# Patient Record
Sex: MALE | Race: OTHER | Employment: OTHER | URBAN - METROPOLITAN AREA
[De-identification: names, ages, dates, MRNs, and addresses within clinical notes are randomized per-mention and may not be internally consistent; named-entity substitution may affect disease eponyms.]

---

## 2018-06-03 ENCOUNTER — HOSPITAL ENCOUNTER (EMERGENCY)
Age: 68
Discharge: HOME | End: 2018-06-03
Attending: EMERGENCY MEDICINE
Payer: MEDICARE

## 2018-06-03 VITALS
TEMPERATURE: 98 F | OXYGEN SATURATION: 99 % | RESPIRATION RATE: 20 BRPM | DIASTOLIC BLOOD PRESSURE: 70 MMHG | SYSTOLIC BLOOD PRESSURE: 124 MMHG | WEIGHT: 220 LBS | HEART RATE: 80 BPM | BODY MASS INDEX: 32.58 KG/M2 | HEIGHT: 69 IN

## 2018-06-03 DIAGNOSIS — R33.9 URINARY RETENTION: Primary | ICD-10-CM

## 2018-06-03 DIAGNOSIS — E86.0 DEHYDRATION: ICD-10-CM

## 2018-06-03 PROCEDURE — 51702 INSERT TEMP BLADDER CATH: CPT | Performed by: EMERGENCY MEDICINE

## 2018-06-03 PROCEDURE — 81003 URINALYSIS AUTO W/O SCOPE: CPT | Performed by: PHYSICIAN ASSISTANT

## 2018-06-03 PROCEDURE — 85025 COMPLETE CBC W/AUTO DIFF WBC: CPT | Performed by: PHYSICIAN ASSISTANT

## 2018-06-03 PROCEDURE — 99283 EMERGENCY DEPT VISIT LOW MDM: CPT | Performed by: EMERGENCY MEDICINE

## 2018-06-03 PROCEDURE — 99284 EMERGENCY DEPT VISIT MOD MDM: CPT | Performed by: EMERGENCY MEDICINE

## 2018-06-03 PROCEDURE — 80048 BASIC METABOLIC PNL TOTAL CA: CPT | Performed by: PHYSICIAN ASSISTANT

## 2018-06-03 PROCEDURE — 96360 HYDRATION IV INFUSION INIT: CPT | Performed by: EMERGENCY MEDICINE

## 2018-06-03 RX ORDER — TAMSULOSIN HYDROCHLORIDE 0.4 MG/1
0.4 CAPSULE ORAL DAILY
COMMUNITY

## 2018-06-03 RX ORDER — HYDROCHLOROTHIAZIDE 12.5 MG/1
12.5 CAPSULE, GELATIN COATED ORAL DAILY
COMMUNITY

## 2018-06-03 RX ORDER — OXYBUTYNIN CHLORIDE 10 MG/1
10 TABLET, EXTENDED RELEASE ORAL DAILY
COMMUNITY

## 2018-06-03 RX ORDER — METOPROLOL SUCCINATE 25 MG/1
25 TABLET, EXTENDED RELEASE ORAL DAILY
COMMUNITY

## 2018-06-03 RX ORDER — ENALAPRIL MALEATE 20 MG/1
20 TABLET ORAL DAILY
COMMUNITY

## 2018-06-03 RX ORDER — CEPHALEXIN 500 MG/1
500 CAPSULE ORAL ONCE
Status: COMPLETED | OUTPATIENT
Start: 2018-06-03 | End: 2018-06-03

## 2018-06-03 RX ORDER — CEPHALEXIN 500 MG/1
500 CAPSULE ORAL 2 TIMES DAILY
Qty: 20 CAPSULE | Refills: 0 | Status: SHIPPED | OUTPATIENT
Start: 2018-06-03 | End: 2018-06-13

## 2018-06-03 NOTE — ED PROVIDER NOTES
Patient Seen in: [facility] In [place]    History   Patient presents with:  Urinary Symptoms (urologic)    Stated Complaint: urine retention    79-year-old male with a history of diabetes, hypertension, BPH presents to the ER with com Neurological: He is alert. Psychiatric: He has a normal mood and affect.          ED Course     Labs Reviewed   URINALYSIS WITH CULTURE REFLEX - Abnormal; Notable for the following:        Result Value    Blood Urine Trace-lysed (*)     All other compon Prescribed:  Current Discharge Medication List    START taking these medications    cephALEXin (KEFLEX) 500 MG Oral Cap  Take 1 capsule (500 mg total) by mouth 2 (two) times daily.   Qty: 20 capsule Refills: 0

## 2018-06-03 NOTE — ED NOTES
Leg bag applied for patient to go home with catheter  Yuan care discussed with spouse and patient. Denied further questions.

## 2018-06-03 NOTE — ED PROVIDER NOTES
I reviewed that chart and discussed the case. I have examined the patient and noted HEENT exam is normal lungs were normal cardia vascular exam shows regular rhythm abdomen soft nontender.       I agree with the following clinical impression(s):  Urinary r

## 2019-06-21 ENCOUNTER — HOSPITAL ENCOUNTER (EMERGENCY)
Age: 69
Discharge: HOME | End: 2019-06-21
Attending: EMERGENCY MEDICINE
Payer: MEDICARE

## 2019-06-21 VITALS
HEIGHT: 69 IN | BODY MASS INDEX: 32.58 KG/M2 | WEIGHT: 220 LBS | TEMPERATURE: 100 F | SYSTOLIC BLOOD PRESSURE: 143 MMHG | HEART RATE: 101 BPM | DIASTOLIC BLOOD PRESSURE: 79 MMHG | RESPIRATION RATE: 18 BRPM | OXYGEN SATURATION: 100 %

## 2019-06-21 DIAGNOSIS — R73.9 HYPERGLYCEMIA: ICD-10-CM

## 2019-06-21 DIAGNOSIS — E86.0 DEHYDRATION: ICD-10-CM

## 2019-06-21 DIAGNOSIS — N30.01 ACUTE CYSTITIS WITH HEMATURIA: Primary | ICD-10-CM

## 2019-06-21 PROCEDURE — 82962 GLUCOSE BLOOD TEST: CPT

## 2019-06-21 PROCEDURE — 96372 THER/PROPH/DIAG INJ SC/IM: CPT

## 2019-06-21 PROCEDURE — 87086 URINE CULTURE/COLONY COUNT: CPT | Performed by: EMERGENCY MEDICINE

## 2019-06-21 PROCEDURE — 80053 COMPREHEN METABOLIC PANEL: CPT | Performed by: EMERGENCY MEDICINE

## 2019-06-21 PROCEDURE — 85025 COMPLETE CBC W/AUTO DIFF WBC: CPT | Performed by: EMERGENCY MEDICINE

## 2019-06-21 PROCEDURE — 83605 ASSAY OF LACTIC ACID: CPT | Performed by: EMERGENCY MEDICINE

## 2019-06-21 PROCEDURE — 36415 COLL VENOUS BLD VENIPUNCTURE: CPT

## 2019-06-21 PROCEDURE — 99284 EMERGENCY DEPT VISIT MOD MDM: CPT

## 2019-06-21 PROCEDURE — 87040 BLOOD CULTURE FOR BACTERIA: CPT | Performed by: EMERGENCY MEDICINE

## 2019-06-21 PROCEDURE — 81001 URINALYSIS AUTO W/SCOPE: CPT | Performed by: EMERGENCY MEDICINE

## 2019-06-21 PROCEDURE — 96365 THER/PROPH/DIAG IV INF INIT: CPT

## 2019-06-21 RX ORDER — CEPHALEXIN 500 MG/1
500 CAPSULE ORAL 3 TIMES DAILY
Qty: 21 CAPSULE | Refills: 0 | Status: SHIPPED | OUTPATIENT
Start: 2019-06-21 | End: 2019-06-28

## 2019-06-21 RX ORDER — AMLODIPINE, VALSARTAN AND HYDROCHLOROTHIAZIDE 5; 160; 12.5 MG/1; MG/1; MG/1
TABLET ORAL
COMMUNITY

## 2019-06-21 RX ORDER — ACETAMINOPHEN 500 MG
1000 TABLET ORAL ONCE
Status: COMPLETED | OUTPATIENT
Start: 2019-06-21 | End: 2019-06-21

## 2019-06-21 RX ORDER — INSULIN ASPART 100 [IU]/ML
0.1 INJECTION, SOLUTION INTRAVENOUS; SUBCUTANEOUS ONCE
Status: COMPLETED | OUTPATIENT
Start: 2019-06-21 | End: 2019-06-21

## 2019-06-21 RX ORDER — ACETAMINOPHEN 500 MG
1000 TABLET ORAL ONCE
Status: DISCONTINUED | OUTPATIENT
Start: 2019-06-21 | End: 2019-06-21

## 2019-06-21 RX ORDER — FOLIC ACID 1 MG/1
TABLET ORAL DAILY
COMMUNITY

## 2019-06-21 NOTE — ED INITIAL ASSESSMENT (HPI)
Pt c/o r. Flank pain onset yesterday with sl. Nausea and fever 100, woke up this morning with chills. Noticed blood in urine.

## 2019-06-21 NOTE — ED PROVIDER NOTES
Patient Seen in: [facility] In [place]    History   Patient presents with:  Fever (infectious)  Bleeding (hematologic)    Stated Complaint: fever, urinary symptoms    HPI    Patient is a very pleasant 60-year-old male who presents wit or guarding  Extremities: No clubbing/cyanosis/edema. Skin: No rashes, no pallor  Neuro: Awake oriented ×3.   Nonfocal.  Good strength throughout    ED Course     Labs Reviewed   COMP METABOLIC PANEL (14) - Abnormal; Notable for the following components: BLOOD CULTURE   BLOOD CULTURE   URINE CULTURE, ROUTINE          Patient presents with hematuria and likely urinary tract infection associate with BPH. Does have a fever and feels generalized weakness here. IV was placed and he was hydrated.   Blood work

## 2022-07-14 ENCOUNTER — HOSPITAL ENCOUNTER (EMERGENCY)
Age: 72
Discharge: HOME | End: 2022-07-15
Attending: EMERGENCY MEDICINE
Payer: MEDICARE

## 2022-07-14 ENCOUNTER — APPOINTMENT (OUTPATIENT)
Dept: GENERAL RADIOLOGY | Age: 72
End: 2022-07-14
Attending: EMERGENCY MEDICINE
Payer: MEDICARE

## 2022-07-14 VITALS
WEIGHT: 210 LBS | HEIGHT: 71 IN | BODY MASS INDEX: 29.4 KG/M2 | OXYGEN SATURATION: 98 % | SYSTOLIC BLOOD PRESSURE: 120 MMHG | DIASTOLIC BLOOD PRESSURE: 67 MMHG | TEMPERATURE: 99 F | RESPIRATION RATE: 16 BRPM | HEART RATE: 77 BPM

## 2022-07-14 DIAGNOSIS — N30.00 ACUTE CYSTITIS WITHOUT HEMATURIA: Primary | ICD-10-CM

## 2022-07-14 DIAGNOSIS — H60.332 ACUTE SWIMMER'S EAR OF LEFT SIDE: ICD-10-CM

## 2022-07-14 DIAGNOSIS — R50.9 FEVER, UNSPECIFIED FEVER CAUSE: ICD-10-CM

## 2022-07-14 LAB
ALBUMIN SERPL-MCNC: 3.3 G/DL (ref 3.4–5)
ALBUMIN/GLOB SERPL: 0.8 {RATIO} (ref 1–2)
ALP LIVER SERPL-CCNC: 52 U/L
ALT SERPL-CCNC: 18 U/L
ANION GAP SERPL CALC-SCNC: 5 MMOL/L (ref 0–18)
AST SERPL-CCNC: 15 U/L (ref 15–37)
BASOPHILS # BLD AUTO: 0.02 X10(3) UL (ref 0–0.2)
BASOPHILS NFR BLD AUTO: 0.2 %
BILIRUB SERPL-MCNC: 0.5 MG/DL (ref 0.1–2)
BILIRUB UR QL STRIP.AUTO: NEGATIVE
BUN BLD-MCNC: 13 MG/DL (ref 7–18)
CALCIUM BLD-MCNC: 8.7 MG/DL (ref 8.5–10.1)
CHLORIDE SERPL-SCNC: 105 MMOL/L (ref 98–112)
CLARITY UR REFRACT.AUTO: CLEAR
CO2 SERPL-SCNC: 25 MMOL/L (ref 21–32)
COLOR UR AUTO: YELLOW
CREAT BLD-MCNC: 1.46 MG/DL
EOSINOPHIL # BLD AUTO: 0.02 X10(3) UL (ref 0–0.7)
EOSINOPHIL NFR BLD AUTO: 0.2 %
ERYTHROCYTE [DISTWIDTH] IN BLOOD BY AUTOMATED COUNT: 13.2 %
GLOBULIN PLAS-MCNC: 4.2 G/DL (ref 2.8–4.4)
GLUCOSE BLD-MCNC: 132 MG/DL (ref 70–99)
GLUCOSE UR STRIP.AUTO-MCNC: NEGATIVE MG/DL
HCT VFR BLD AUTO: 40.8 %
HGB BLD-MCNC: 13.6 G/DL
HYALINE CASTS #/AREA URNS AUTO: PRESENT /LPF
IMM GRANULOCYTES # BLD AUTO: 0.04 X10(3) UL (ref 0–1)
IMM GRANULOCYTES NFR BLD: 0.5 %
KETONES UR STRIP.AUTO-MCNC: NEGATIVE MG/DL
LEUKOCYTE ESTERASE UR QL STRIP.AUTO: NEGATIVE
LYMPHOCYTES # BLD AUTO: 0.74 X10(3) UL (ref 1–4)
LYMPHOCYTES NFR BLD AUTO: 8.7 %
MCH RBC QN AUTO: 30 PG (ref 26–34)
MCHC RBC AUTO-ENTMCNC: 33.3 G/DL (ref 31–37)
MCV RBC AUTO: 90.1 FL
MONOCYTES # BLD AUTO: 0.97 X10(3) UL (ref 0.1–1)
MONOCYTES NFR BLD AUTO: 11.5 %
NEUTROPHILS # BLD AUTO: 6.67 X10 (3) UL (ref 1.5–7.7)
NEUTROPHILS # BLD AUTO: 6.67 X10(3) UL (ref 1.5–7.7)
NEUTROPHILS NFR BLD AUTO: 78.9 %
NITRITE UR QL STRIP.AUTO: NEGATIVE
OSMOLALITY SERPL CALC.SUM OF ELEC: 282 MOSM/KG (ref 275–295)
PH UR STRIP.AUTO: 5 [PH] (ref 5–8)
PLATELET # BLD AUTO: 144 10(3)UL (ref 150–450)
POTASSIUM SERPL-SCNC: 4.2 MMOL/L (ref 3.5–5.1)
PROT SERPL-MCNC: 7.5 G/DL (ref 6.4–8.2)
RBC # BLD AUTO: 4.53 X10(6)UL
RBC UR QL AUTO: NEGATIVE
SARS-COV-2 RNA RESP QL NAA+PROBE: NOT DETECTED
SODIUM SERPL-SCNC: 135 MMOL/L (ref 136–145)
SP GR UR STRIP.AUTO: >=1.03 (ref 1–1.03)
UROBILINOGEN UR STRIP.AUTO-MCNC: 0.2 MG/DL
WBC # BLD AUTO: 8.5 X10(3) UL (ref 4–11)

## 2022-07-14 PROCEDURE — 85025 COMPLETE CBC W/AUTO DIFF WBC: CPT | Performed by: EMERGENCY MEDICINE

## 2022-07-14 PROCEDURE — 87040 BLOOD CULTURE FOR BACTERIA: CPT | Performed by: EMERGENCY MEDICINE

## 2022-07-14 PROCEDURE — 36415 COLL VENOUS BLD VENIPUNCTURE: CPT

## 2022-07-14 PROCEDURE — 99284 EMERGENCY DEPT VISIT MOD MDM: CPT

## 2022-07-14 PROCEDURE — 80053 COMPREHEN METABOLIC PANEL: CPT | Performed by: EMERGENCY MEDICINE

## 2022-07-14 PROCEDURE — 71045 X-RAY EXAM CHEST 1 VIEW: CPT | Performed by: EMERGENCY MEDICINE

## 2022-07-14 PROCEDURE — 96365 THER/PROPH/DIAG IV INF INIT: CPT

## 2022-07-14 PROCEDURE — 81001 URINALYSIS AUTO W/SCOPE: CPT | Performed by: EMERGENCY MEDICINE

## 2022-07-14 RX ORDER — CEPHALEXIN 500 MG/1
500 CAPSULE ORAL 4 TIMES DAILY
Qty: 40 CAPSULE | Refills: 0 | Status: SHIPPED | OUTPATIENT
Start: 2022-07-14 | End: 2022-07-15

## 2022-07-14 RX ORDER — IBUPROFEN 600 MG/1
600 TABLET ORAL ONCE
Status: COMPLETED | OUTPATIENT
Start: 2022-07-14 | End: 2022-07-14

## 2022-07-14 RX ORDER — ACETAMINOPHEN 500 MG
1000 TABLET ORAL ONCE
Status: COMPLETED | OUTPATIENT
Start: 2022-07-14 | End: 2022-07-14

## 2022-07-15 RX ORDER — DIPHENHYDRAMINE HCL 25 MG
25 CAPSULE ORAL ONCE
Status: COMPLETED | OUTPATIENT
Start: 2022-07-15 | End: 2022-07-15

## 2022-07-15 RX ORDER — SULFAMETHOXAZOLE AND TRIMETHOPRIM 800; 160 MG/1; MG/1
1 TABLET ORAL 2 TIMES DAILY
Qty: 20 TABLET | Refills: 0 | Status: SHIPPED | OUTPATIENT
Start: 2022-07-15 | End: 2022-07-25

## 2022-07-15 RX ORDER — CIPROFLOXACIN AND DEXAMETHASONE 3; 1 MG/ML; MG/ML
4 SUSPENSION/ DROPS AURICULAR (OTIC) 2 TIMES DAILY
Qty: 7.5 ML | Refills: 0 | Status: SHIPPED | OUTPATIENT
Start: 2022-07-15 | End: 2022-07-22

## 2022-07-21 ENCOUNTER — HOSPITAL ENCOUNTER (EMERGENCY)
Age: 72
Discharge: HOME | End: 2022-07-21
Attending: EMERGENCY MEDICINE
Payer: MEDICARE

## 2022-07-21 ENCOUNTER — APPOINTMENT (OUTPATIENT)
Dept: ULTRASOUND IMAGING | Age: 72
End: 2022-07-21
Attending: EMERGENCY MEDICINE
Payer: MEDICARE

## 2022-07-21 VITALS
BODY MASS INDEX: 29.4 KG/M2 | WEIGHT: 210 LBS | RESPIRATION RATE: 16 BRPM | HEART RATE: 76 BPM | DIASTOLIC BLOOD PRESSURE: 72 MMHG | TEMPERATURE: 98 F | HEIGHT: 71 IN | SYSTOLIC BLOOD PRESSURE: 105 MMHG | OXYGEN SATURATION: 97 %

## 2022-07-21 DIAGNOSIS — N40.0 BENIGN PROSTATIC HYPERPLASIA, UNSPECIFIED WHETHER LOWER URINARY TRACT SYMPTOMS PRESENT: ICD-10-CM

## 2022-07-21 DIAGNOSIS — R33.9 URINARY RETENTION: Primary | ICD-10-CM

## 2022-07-21 LAB
ALBUMIN SERPL-MCNC: 3.8 G/DL (ref 3.4–5)
ALBUMIN/GLOB SERPL: 0.9 {RATIO} (ref 1–2)
ALP LIVER SERPL-CCNC: 69 U/L
ALT SERPL-CCNC: 32 U/L
ANION GAP SERPL CALC-SCNC: 2 MMOL/L (ref 0–18)
AST SERPL-CCNC: 30 U/L (ref 15–37)
BASOPHILS # BLD AUTO: 0.02 X10(3) UL (ref 0–0.2)
BASOPHILS NFR BLD AUTO: 0.4 %
BILIRUB SERPL-MCNC: 0.3 MG/DL (ref 0.1–2)
BILIRUB UR QL STRIP.AUTO: NEGATIVE
BUN BLD-MCNC: 14 MG/DL (ref 7–18)
CALCIUM BLD-MCNC: 9.5 MG/DL (ref 8.5–10.1)
CHLORIDE SERPL-SCNC: 99 MMOL/L (ref 98–112)
CO2 SERPL-SCNC: 30 MMOL/L (ref 21–32)
COLOR UR AUTO: YELLOW
CREAT BLD-MCNC: 1.67 MG/DL
EOSINOPHIL # BLD AUTO: 0.07 X10(3) UL (ref 0–0.7)
EOSINOPHIL NFR BLD AUTO: 1.5 %
ERYTHROCYTE [DISTWIDTH] IN BLOOD BY AUTOMATED COUNT: 12.5 %
GLOBULIN PLAS-MCNC: 4.1 G/DL (ref 2.8–4.4)
GLUCOSE BLD-MCNC: 106 MG/DL (ref 70–99)
GLUCOSE UR STRIP.AUTO-MCNC: NEGATIVE MG/DL
HCT VFR BLD AUTO: 43.2 %
HGB BLD-MCNC: 14.3 G/DL
IMM GRANULOCYTES # BLD AUTO: 0.03 X10(3) UL (ref 0–1)
IMM GRANULOCYTES NFR BLD: 0.6 %
KETONES UR STRIP.AUTO-MCNC: NEGATIVE MG/DL
LEUKOCYTE ESTERASE UR QL STRIP.AUTO: NEGATIVE
LYMPHOCYTES # BLD AUTO: 0.93 X10(3) UL (ref 1–4)
LYMPHOCYTES NFR BLD AUTO: 19.7 %
MCH RBC QN AUTO: 29.6 PG (ref 26–34)
MCHC RBC AUTO-ENTMCNC: 33.1 G/DL (ref 31–37)
MCV RBC AUTO: 89.4 FL
MONOCYTES # BLD AUTO: 0.57 X10(3) UL (ref 0.1–1)
MONOCYTES NFR BLD AUTO: 12.1 %
NEUTROPHILS # BLD AUTO: 3.11 X10 (3) UL (ref 1.5–7.7)
NEUTROPHILS # BLD AUTO: 3.11 X10(3) UL (ref 1.5–7.7)
NEUTROPHILS NFR BLD AUTO: 65.7 %
NITRITE UR QL STRIP.AUTO: NEGATIVE
OSMOLALITY SERPL CALC.SUM OF ELEC: 273 MOSM/KG (ref 275–295)
PH UR STRIP.AUTO: 7.5 [PH] (ref 5–8)
PLATELET # BLD AUTO: 251 10(3)UL (ref 150–450)
POTASSIUM SERPL-SCNC: 5.9 MMOL/L (ref 3.5–5.1)
PROT SERPL-MCNC: 7.9 G/DL (ref 6.4–8.2)
PROT UR STRIP.AUTO-MCNC: NEGATIVE MG/DL
RBC # BLD AUTO: 4.83 X10(6)UL
RBC #/AREA URNS AUTO: >10 /HPF
RBC #/AREA URNS AUTO: >10 /HPF
SODIUM SERPL-SCNC: 131 MMOL/L (ref 136–145)
SP GR UR STRIP.AUTO: 1.01 (ref 1–1.03)
UROBILINOGEN UR STRIP.AUTO-MCNC: 0.2 MG/DL
WBC # BLD AUTO: 4.7 X10(3) UL (ref 4–11)

## 2022-07-21 PROCEDURE — 85025 COMPLETE CBC W/AUTO DIFF WBC: CPT | Performed by: EMERGENCY MEDICINE

## 2022-07-21 PROCEDURE — 99284 EMERGENCY DEPT VISIT MOD MDM: CPT

## 2022-07-21 PROCEDURE — 99285 EMERGENCY DEPT VISIT HI MDM: CPT

## 2022-07-21 PROCEDURE — 81001 URINALYSIS AUTO W/SCOPE: CPT | Performed by: EMERGENCY MEDICINE

## 2022-07-21 PROCEDURE — 36415 COLL VENOUS BLD VENIPUNCTURE: CPT

## 2022-07-21 PROCEDURE — 80053 COMPREHEN METABOLIC PANEL: CPT | Performed by: EMERGENCY MEDICINE

## 2022-07-21 PROCEDURE — 51702 INSERT TEMP BLADDER CATH: CPT

## 2022-07-21 PROCEDURE — 76775 US EXAM ABDO BACK WALL LIM: CPT | Performed by: EMERGENCY MEDICINE

## 2022-07-21 PROCEDURE — 81015 MICROSCOPIC EXAM OF URINE: CPT | Performed by: EMERGENCY MEDICINE

## 2022-07-21 RX ORDER — TAMSULOSIN HYDROCHLORIDE 0.4 MG/1
0.4 CAPSULE ORAL DAILY
Qty: 7 CAPSULE | Refills: 0 | Status: SHIPPED | OUTPATIENT
Start: 2022-07-21 | End: 2022-07-21 | Stop reason: CLARIF

## 2022-07-21 RX ORDER — LIDOCAINE HYDROCHLORIDE 20 MG/ML
5 JELLY TOPICAL ONCE
Status: DISCONTINUED | OUTPATIENT
Start: 2022-07-21 | End: 2022-07-21

## 2022-07-21 RX ORDER — TAMSULOSIN HYDROCHLORIDE 0.4 MG/1
0.4 CAPSULE ORAL DAILY
Qty: 7 CAPSULE | Refills: 0 | Status: SHIPPED | OUTPATIENT
Start: 2022-07-21 | End: 2022-07-28

## 2022-07-30 ENCOUNTER — HOSPITAL ENCOUNTER (EMERGENCY)
Age: 72
Discharge: HOME | End: 2022-07-30
Attending: EMERGENCY MEDICINE
Payer: MEDICARE

## 2022-07-30 VITALS
BODY MASS INDEX: 29.42 KG/M2 | HEART RATE: 92 BPM | TEMPERATURE: 98 F | HEIGHT: 71 IN | WEIGHT: 210.13 LBS | DIASTOLIC BLOOD PRESSURE: 83 MMHG | RESPIRATION RATE: 18 BRPM | OXYGEN SATURATION: 98 % | SYSTOLIC BLOOD PRESSURE: 157 MMHG

## 2022-07-30 DIAGNOSIS — R33.8 ACUTE URINARY RETENTION: Primary | ICD-10-CM

## 2022-07-30 LAB
BILIRUB UR QL STRIP.AUTO: NEGATIVE
CLARITY UR REFRACT.AUTO: CLEAR
COLOR UR AUTO: YELLOW
GLUCOSE UR STRIP.AUTO-MCNC: NEGATIVE MG/DL
KETONES UR STRIP.AUTO-MCNC: NEGATIVE MG/DL
LEUKOCYTE ESTERASE UR QL STRIP.AUTO: NEGATIVE
NITRITE UR QL STRIP.AUTO: NEGATIVE
PH UR STRIP.AUTO: 5.5 [PH] (ref 5–8)
PROT UR STRIP.AUTO-MCNC: NEGATIVE MG/DL
SP GR UR STRIP.AUTO: 1.01 (ref 1–1.03)
UROBILINOGEN UR STRIP.AUTO-MCNC: 0.2 MG/DL

## 2022-07-30 PROCEDURE — 99283 EMERGENCY DEPT VISIT LOW MDM: CPT

## 2022-07-30 PROCEDURE — 81001 URINALYSIS AUTO W/SCOPE: CPT

## 2022-07-30 PROCEDURE — 51702 INSERT TEMP BLADDER CATH: CPT

## 2022-07-30 PROCEDURE — 81001 URINALYSIS AUTO W/SCOPE: CPT | Performed by: EMERGENCY MEDICINE

## 2022-07-30 NOTE — ED INITIAL ASSESSMENT (HPI)
Pt here for urinary retention since 0100 today.   Pt was here last week for same problem, had catheter placed, went home with leg bag and followed up with PCP on Wednesday, states catheter was removed that day and was fine until this am.

## 2022-07-30 NOTE — ED PROVIDER NOTES
I reviewed that chart and discussed the case. I have reviewed the patient and noted suprapubic tenderness with urinary retention since 1 AM after having Yuan catheter removed last Wednesday. Yaun catheter placed here in ED.  1600 cc in bag after Yuan placed. Patient will follow-up with urology. I did the substantive portion of the medical decision making. I agree with the following clinical impression(s):  Acute urinary retention  (primary encounter diagnosis). I agree with the plan as noted.

## 2023-10-06 ENCOUNTER — APPOINTMENT (OUTPATIENT)
Dept: GENERAL RADIOLOGY | Age: 73
End: 2023-10-06
Attending: EMERGENCY MEDICINE
Payer: MEDICARE

## 2023-10-06 ENCOUNTER — HOSPITAL ENCOUNTER (EMERGENCY)
Age: 73
Discharge: HOME | End: 2023-10-06
Attending: EMERGENCY MEDICINE
Payer: MEDICARE

## 2023-10-06 VITALS
OXYGEN SATURATION: 94 % | BODY MASS INDEX: 32.58 KG/M2 | RESPIRATION RATE: 16 BRPM | SYSTOLIC BLOOD PRESSURE: 153 MMHG | WEIGHT: 220 LBS | HEART RATE: 93 BPM | TEMPERATURE: 101 F | DIASTOLIC BLOOD PRESSURE: 77 MMHG | HEIGHT: 69 IN

## 2023-10-06 DIAGNOSIS — R50.9 FEVER OF UNKNOWN ORIGIN: Primary | ICD-10-CM

## 2023-10-06 LAB
ALBUMIN SERPL-MCNC: 3.6 G/DL (ref 3.4–5)
ALBUMIN/GLOB SERPL: 0.8 {RATIO} (ref 1–2)
ALP LIVER SERPL-CCNC: 71 U/L
ALT SERPL-CCNC: 25 U/L
ANION GAP SERPL CALC-SCNC: 6 MMOL/L (ref 0–18)
AST SERPL-CCNC: 19 U/L (ref 15–37)
BASOPHILS # BLD AUTO: 0.03 X10(3) UL (ref 0–0.2)
BASOPHILS NFR BLD AUTO: 0.3 %
BILIRUB SERPL-MCNC: 0.7 MG/DL (ref 0.1–2)
BILIRUB UR QL STRIP.AUTO: NEGATIVE
BUN BLD-MCNC: 13 MG/DL (ref 7–18)
CALCIUM BLD-MCNC: 9.1 MG/DL (ref 8.5–10.1)
CHLORIDE SERPL-SCNC: 100 MMOL/L (ref 98–112)
CLARITY UR REFRACT.AUTO: CLEAR
CO2 SERPL-SCNC: 27 MMOL/L (ref 21–32)
COLOR UR AUTO: YELLOW
CREAT BLD-MCNC: 1.47 MG/DL
EGFRCR SERPLBLD CKD-EPI 2021: 50 ML/MIN/1.73M2 (ref 60–?)
EOSINOPHIL # BLD AUTO: 0 X10(3) UL (ref 0–0.7)
EOSINOPHIL NFR BLD AUTO: 0 %
ERYTHROCYTE [DISTWIDTH] IN BLOOD BY AUTOMATED COUNT: 13 %
GLOBULIN PLAS-MCNC: 4.8 G/DL (ref 2.8–4.4)
GLUCOSE BLD-MCNC: 177 MG/DL (ref 70–99)
GLUCOSE UR STRIP.AUTO-MCNC: 250 MG/DL
HCT VFR BLD AUTO: 42 %
HGB BLD-MCNC: 14.4 G/DL
IMM GRANULOCYTES # BLD AUTO: 0.04 X10(3) UL (ref 0–1)
IMM GRANULOCYTES NFR BLD: 0.5 %
LACTATE SERPL-SCNC: 1.3 MMOL/L (ref 0.4–2)
LEUKOCYTE ESTERASE UR QL STRIP.AUTO: NEGATIVE
LYMPHOCYTES # BLD AUTO: 1.19 X10(3) UL (ref 1–4)
LYMPHOCYTES NFR BLD AUTO: 13.8 %
MCH RBC QN AUTO: 30.4 PG (ref 26–34)
MCHC RBC AUTO-ENTMCNC: 34.3 G/DL (ref 31–37)
MCV RBC AUTO: 88.8 FL
MONOCYTES # BLD AUTO: 0.94 X10(3) UL (ref 0.1–1)
MONOCYTES NFR BLD AUTO: 10.9 %
NEUTROPHILS # BLD AUTO: 6.45 X10 (3) UL (ref 1.5–7.7)
NEUTROPHILS # BLD AUTO: 6.45 X10(3) UL (ref 1.5–7.7)
NEUTROPHILS NFR BLD AUTO: 74.5 %
NITRITE UR QL STRIP.AUTO: NEGATIVE
OSMOLALITY SERPL CALC.SUM OF ELEC: 280 MOSM/KG (ref 275–295)
PH UR STRIP.AUTO: 5 [PH] (ref 5–8)
PLATELET # BLD AUTO: 166 10(3)UL (ref 150–450)
POTASSIUM SERPL-SCNC: 4.1 MMOL/L (ref 3.5–5.1)
PROT SERPL-MCNC: 8.4 G/DL (ref 6.4–8.2)
PROT UR STRIP.AUTO-MCNC: >=300 MG/DL
RBC # BLD AUTO: 4.73 X10(6)UL
SARS-COV-2 RNA RESP QL NAA+PROBE: NOT DETECTED
SODIUM SERPL-SCNC: 133 MMOL/L (ref 136–145)
SP GR UR STRIP.AUTO: >=1.03 (ref 1–1.03)
UROBILINOGEN UR STRIP.AUTO-MCNC: 2 MG/DL
WBC # BLD AUTO: 8.7 X10(3) UL (ref 4–11)

## 2023-10-06 PROCEDURE — 99281 EMR DPT VST MAYX REQ PHY/QHP: CPT

## 2023-10-06 PROCEDURE — 85025 COMPLETE CBC W/AUTO DIFF WBC: CPT | Performed by: EMERGENCY MEDICINE

## 2023-10-06 PROCEDURE — 80053 COMPREHEN METABOLIC PANEL: CPT | Performed by: EMERGENCY MEDICINE

## 2023-10-06 PROCEDURE — 81001 URINALYSIS AUTO W/SCOPE: CPT | Performed by: EMERGENCY MEDICINE

## 2023-10-06 PROCEDURE — 71045 X-RAY EXAM CHEST 1 VIEW: CPT | Performed by: EMERGENCY MEDICINE

## 2023-10-06 PROCEDURE — 87430 STREP A AG IA: CPT | Performed by: EMERGENCY MEDICINE

## 2023-10-06 PROCEDURE — 83605 ASSAY OF LACTIC ACID: CPT | Performed by: EMERGENCY MEDICINE

## 2023-10-06 PROCEDURE — 99284 EMERGENCY DEPT VISIT MOD MDM: CPT

## 2023-10-06 PROCEDURE — 87040 BLOOD CULTURE FOR BACTERIA: CPT | Performed by: EMERGENCY MEDICINE

## 2023-10-06 PROCEDURE — 81015 MICROSCOPIC EXAM OF URINE: CPT | Performed by: EMERGENCY MEDICINE

## 2023-10-06 PROCEDURE — 36415 COLL VENOUS BLD VENIPUNCTURE: CPT

## 2023-10-06 RX ORDER — ACETAMINOPHEN 500 MG
1000 TABLET ORAL ONCE
Status: COMPLETED | OUTPATIENT
Start: 2023-10-06 | End: 2023-10-06

## 2023-10-06 RX ORDER — IBUPROFEN 600 MG/1
600 TABLET ORAL ONCE
Status: COMPLETED | OUTPATIENT
Start: 2023-10-06 | End: 2023-10-06

## 2023-10-06 RX ORDER — PANTOPRAZOLE SODIUM 20 MG/1
20 TABLET, DELAYED RELEASE ORAL
COMMUNITY

## 2023-10-06 RX ORDER — AMLODIPINE BESYLATE 10 MG/1
10 TABLET ORAL DAILY
COMMUNITY

## 2024-02-03 ENCOUNTER — HOSPITAL ENCOUNTER (EMERGENCY)
Age: 74
Discharge: HOME | End: 2024-02-03
Attending: EMERGENCY MEDICINE
Payer: MEDICARE

## 2024-02-03 ENCOUNTER — APPOINTMENT (OUTPATIENT)
Dept: GENERAL RADIOLOGY | Age: 74
End: 2024-02-03
Payer: MEDICARE

## 2024-02-03 ENCOUNTER — APPOINTMENT (OUTPATIENT)
Dept: CT IMAGING | Age: 74
End: 2024-02-03
Attending: EMERGENCY MEDICINE
Payer: MEDICARE

## 2024-02-03 VITALS
OXYGEN SATURATION: 99 % | DIASTOLIC BLOOD PRESSURE: 76 MMHG | SYSTOLIC BLOOD PRESSURE: 138 MMHG | RESPIRATION RATE: 17 BRPM | HEIGHT: 70 IN | BODY MASS INDEX: 31.5 KG/M2 | TEMPERATURE: 98 F | WEIGHT: 220 LBS | HEART RATE: 73 BPM

## 2024-02-03 DIAGNOSIS — I71.21 ANEURYSM OF ASCENDING AORTA WITHOUT RUPTURE (HCC): ICD-10-CM

## 2024-02-03 DIAGNOSIS — S16.1XXA STRAIN OF NECK MUSCLE, INITIAL ENCOUNTER: Primary | ICD-10-CM

## 2024-02-03 DIAGNOSIS — R07.89 CHEST PAIN, MUSCULOSKELETAL: ICD-10-CM

## 2024-02-03 DIAGNOSIS — S39.012A BACK STRAIN, INITIAL ENCOUNTER: ICD-10-CM

## 2024-02-03 LAB
ALBUMIN SERPL-MCNC: 3.9 G/DL (ref 3.4–5)
ALBUMIN/GLOB SERPL: 0.9 {RATIO} (ref 1–2)
ALP LIVER SERPL-CCNC: 79 U/L
ALT SERPL-CCNC: 34 U/L
ANION GAP SERPL CALC-SCNC: 6 MMOL/L (ref 0–18)
AST SERPL-CCNC: 24 U/L (ref 15–37)
BASOPHILS # BLD AUTO: 0.05 X10(3) UL (ref 0–0.2)
BASOPHILS NFR BLD AUTO: 0.8 %
BILIRUB SERPL-MCNC: 0.4 MG/DL (ref 0.1–2)
BILIRUB UR QL STRIP.AUTO: NEGATIVE
BUN BLD-MCNC: 21 MG/DL (ref 9–23)
CALCIUM BLD-MCNC: 9 MG/DL (ref 8.5–10.1)
CHLORIDE SERPL-SCNC: 108 MMOL/L (ref 98–112)
CLARITY UR REFRACT.AUTO: CLEAR
CO2 SERPL-SCNC: 25 MMOL/L (ref 21–32)
COLOR UR AUTO: YELLOW
CREAT BLD-MCNC: 1.43 MG/DL
EGFRCR SERPLBLD CKD-EPI 2021: 52 ML/MIN/1.73M2 (ref 60–?)
EOSINOPHIL # BLD AUTO: 0.13 X10(3) UL (ref 0–0.7)
EOSINOPHIL NFR BLD AUTO: 2.2 %
ERYTHROCYTE [DISTWIDTH] IN BLOOD BY AUTOMATED COUNT: 12.5 %
GLOBULIN PLAS-MCNC: 4.3 G/DL (ref 2.8–4.4)
GLUCOSE BLD-MCNC: 148 MG/DL (ref 70–99)
GLUCOSE UR STRIP.AUTO-MCNC: 100 MG/DL
HCT VFR BLD AUTO: 42.1 %
HGB BLD-MCNC: 14.5 G/DL
IMM GRANULOCYTES # BLD AUTO: 0.01 X10(3) UL (ref 0–1)
IMM GRANULOCYTES NFR BLD: 0.2 %
KETONES UR STRIP.AUTO-MCNC: NEGATIVE MG/DL
LEUKOCYTE ESTERASE UR QL STRIP.AUTO: NEGATIVE
LIPASE SERPL-CCNC: 59 U/L (ref 13–75)
LYMPHOCYTES # BLD AUTO: 1.75 X10(3) UL (ref 1–4)
LYMPHOCYTES NFR BLD AUTO: 29.2 %
MCH RBC QN AUTO: 30.5 PG (ref 26–34)
MCHC RBC AUTO-ENTMCNC: 34.4 G/DL (ref 31–37)
MCV RBC AUTO: 88.4 FL
MONOCYTES # BLD AUTO: 0.8 X10(3) UL (ref 0.1–1)
MONOCYTES NFR BLD AUTO: 13.3 %
NEUTROPHILS # BLD AUTO: 3.26 X10 (3) UL (ref 1.5–7.7)
NEUTROPHILS # BLD AUTO: 3.26 X10(3) UL (ref 1.5–7.7)
NEUTROPHILS NFR BLD AUTO: 54.3 %
NITRITE UR QL STRIP.AUTO: NEGATIVE
OSMOLALITY SERPL CALC.SUM OF ELEC: 294 MOSM/KG (ref 275–295)
PH UR STRIP.AUTO: 5.5 [PH] (ref 5–8)
PLATELET # BLD AUTO: 199 10(3)UL (ref 150–450)
POTASSIUM SERPL-SCNC: 4 MMOL/L (ref 3.5–5.1)
PROT SERPL-MCNC: 8.2 G/DL (ref 6.4–8.2)
RBC # BLD AUTO: 4.76 X10(6)UL
RBC UR QL AUTO: NEGATIVE
SODIUM SERPL-SCNC: 139 MMOL/L (ref 136–145)
SP GR UR STRIP.AUTO: >=1.03 (ref 1–1.03)
TROPONIN I SERPL HS-MCNC: 6 NG/L
UROBILINOGEN UR STRIP.AUTO-MCNC: 0.2 MG/DL
WBC # BLD AUTO: 6 X10(3) UL (ref 4–11)

## 2024-02-03 PROCEDURE — 99285 EMERGENCY DEPT VISIT HI MDM: CPT

## 2024-02-03 PROCEDURE — 72100 X-RAY EXAM L-S SPINE 2/3 VWS: CPT

## 2024-02-03 PROCEDURE — 84484 ASSAY OF TROPONIN QUANT: CPT | Performed by: EMERGENCY MEDICINE

## 2024-02-03 PROCEDURE — 71260 CT THORAX DX C+: CPT | Performed by: EMERGENCY MEDICINE

## 2024-02-03 PROCEDURE — 83690 ASSAY OF LIPASE: CPT | Performed by: EMERGENCY MEDICINE

## 2024-02-03 PROCEDURE — 74177 CT ABD & PELVIS W/CONTRAST: CPT | Performed by: EMERGENCY MEDICINE

## 2024-02-03 PROCEDURE — 93005 ELECTROCARDIOGRAM TRACING: CPT

## 2024-02-03 PROCEDURE — 73030 X-RAY EXAM OF SHOULDER: CPT

## 2024-02-03 PROCEDURE — 81001 URINALYSIS AUTO W/SCOPE: CPT | Performed by: EMERGENCY MEDICINE

## 2024-02-03 PROCEDURE — 71101 X-RAY EXAM UNILAT RIBS/CHEST: CPT

## 2024-02-03 PROCEDURE — 85025 COMPLETE CBC W/AUTO DIFF WBC: CPT | Performed by: EMERGENCY MEDICINE

## 2024-02-03 PROCEDURE — 96361 HYDRATE IV INFUSION ADD-ON: CPT

## 2024-02-03 PROCEDURE — 70450 CT HEAD/BRAIN W/O DYE: CPT | Performed by: EMERGENCY MEDICINE

## 2024-02-03 PROCEDURE — 93010 ELECTROCARDIOGRAM REPORT: CPT

## 2024-02-03 PROCEDURE — 80053 COMPREHEN METABOLIC PANEL: CPT | Performed by: EMERGENCY MEDICINE

## 2024-02-03 PROCEDURE — 96374 THER/PROPH/DIAG INJ IV PUSH: CPT

## 2024-02-03 PROCEDURE — 81015 MICROSCOPIC EXAM OF URINE: CPT | Performed by: EMERGENCY MEDICINE

## 2024-02-03 PROCEDURE — 72125 CT NECK SPINE W/O DYE: CPT | Performed by: EMERGENCY MEDICINE

## 2024-02-03 RX ORDER — ORPHENADRINE CITRATE 100 MG/1
100 TABLET, EXTENDED RELEASE ORAL 2 TIMES DAILY PRN
Qty: 10 TABLET | Refills: 0 | Status: SHIPPED | OUTPATIENT
Start: 2024-02-03

## 2024-02-03 RX ORDER — SODIUM CHLORIDE 9 MG/ML
125 INJECTION, SOLUTION INTRAVENOUS CONTINUOUS
Status: DISCONTINUED | OUTPATIENT
Start: 2024-02-03 | End: 2024-02-03

## 2024-02-03 RX ORDER — KETOROLAC TROMETHAMINE 15 MG/ML
15 INJECTION, SOLUTION INTRAMUSCULAR; INTRAVENOUS ONCE
Status: COMPLETED | OUTPATIENT
Start: 2024-02-03 | End: 2024-02-03

## 2024-02-03 RX ORDER — NAPROXEN 500 MG/1
500 TABLET ORAL 2 TIMES DAILY PRN
Qty: 20 TABLET | Refills: 0 | Status: SHIPPED | OUTPATIENT
Start: 2024-02-03

## 2024-02-03 NOTE — DISCHARGE INSTRUCTIONS
Rest  Naprosyn for pain  Norflex for stiffness and spasm  Apply cool compresses today then heating pads tomorrow for 20 minutes at a time on areas of injury    Aortic abnormality identified on today's CT scan requires follow-up      Contact your primary care doctor on Monday to arrange follow-up this week
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
## 2024-02-04 LAB
ATRIAL RATE: 83 BPM
P AXIS: 37 DEGREES
P-R INTERVAL: 202 MS
Q-T INTERVAL: 368 MS
QRS DURATION: 102 MS
QTC CALCULATION (BEZET): 432 MS
R AXIS: -62 DEGREES
T AXIS: 21 DEGREES
VENTRICULAR RATE: 83 BPM

## 2024-10-16 ENCOUNTER — APPOINTMENT (OUTPATIENT)
Dept: CT IMAGING | Age: 74
End: 2024-10-16
Attending: EMERGENCY MEDICINE
Payer: MEDICARE

## 2024-10-16 ENCOUNTER — APPOINTMENT (OUTPATIENT)
Dept: GENERAL RADIOLOGY | Age: 74
End: 2024-10-16
Attending: EMERGENCY MEDICINE
Payer: MEDICARE

## 2024-10-16 ENCOUNTER — HOSPITAL ENCOUNTER (INPATIENT)
Facility: HOSPITAL | Age: 74
LOS: 3 days | Discharge: HOME | End: 2024-10-19
Attending: EMERGENCY MEDICINE | Admitting: STUDENT IN AN ORGANIZED HEALTH CARE EDUCATION/TRAINING PROGRAM
Payer: MEDICARE

## 2024-10-16 DIAGNOSIS — K81.0 ACUTE CHOLECYSTITIS: Primary | ICD-10-CM

## 2024-10-16 DIAGNOSIS — G89.18 POST-OPERATIVE PAIN: ICD-10-CM

## 2024-10-16 DIAGNOSIS — K81.9 CHOLECYSTITIS: ICD-10-CM

## 2024-10-16 DIAGNOSIS — R06.02 SHORTNESS OF BREATH: ICD-10-CM

## 2024-10-16 LAB
ALBUMIN SERPL-MCNC: 3.2 G/DL (ref 3.4–5)
ALBUMIN/GLOB SERPL: 0.6 {RATIO} (ref 1–2)
ALP LIVER SERPL-CCNC: 105 U/L
ALT SERPL-CCNC: 32 U/L
ANION GAP SERPL CALC-SCNC: 10 MMOL/L (ref 0–18)
AST SERPL-CCNC: 29 U/L (ref 15–37)
BASOPHILS # BLD AUTO: 0.01 X10(3) UL (ref 0–0.2)
BASOPHILS NFR BLD AUTO: 0.5 %
BILIRUB SERPL-MCNC: 1.6 MG/DL (ref 0.1–2)
BUN BLD-MCNC: 18 MG/DL (ref 9–23)
CALCIUM BLD-MCNC: 9.3 MG/DL (ref 8.5–10.1)
CHLORIDE SERPL-SCNC: 91 MMOL/L (ref 98–112)
CO2 SERPL-SCNC: 26 MMOL/L (ref 21–32)
CREAT BLD-MCNC: 1.72 MG/DL
D DIMER PPP FEU-MCNC: 5.74 UG/ML FEU (ref ?–0.73)
EGFRCR SERPLBLD CKD-EPI 2021: 41 ML/MIN/1.73M2 (ref 60–?)
EOSINOPHIL # BLD AUTO: 0.01 X10(3) UL (ref 0–0.7)
EOSINOPHIL NFR BLD AUTO: 0.5 %
ERYTHROCYTE [DISTWIDTH] IN BLOOD BY AUTOMATED COUNT: 14.6 %
GLOBULIN PLAS-MCNC: 5.3 G/DL (ref 2.8–4.4)
GLUCOSE BLD-MCNC: 141 MG/DL (ref 70–99)
HCT VFR BLD AUTO: 40.6 %
HGB BLD-MCNC: 14 G/DL
IMM GRANULOCYTES # BLD AUTO: 0.01 X10(3) UL (ref 0–1)
IMM GRANULOCYTES NFR BLD: 0.5 %
LACTATE SERPL-SCNC: 8.4 MMOL/L (ref 0.4–2)
LYMPHOCYTES # BLD AUTO: 0.43 X10(3) UL (ref 1–4)
LYMPHOCYTES NFR BLD AUTO: 22.2 %
MAGNESIUM SERPL-MCNC: 1.8 MG/DL (ref 1.6–2.6)
MCH RBC QN AUTO: 30 PG (ref 26–34)
MCHC RBC AUTO-ENTMCNC: 34.5 G/DL (ref 31–37)
MCV RBC AUTO: 86.9 FL
MONOCYTES # BLD AUTO: 0.04 X10(3) UL (ref 0.1–1)
MONOCYTES NFR BLD AUTO: 2.1 %
NEUTROPHILS # BLD AUTO: 1.44 X10 (3) UL (ref 1.5–7.7)
NEUTROPHILS # BLD AUTO: 1.44 X10(3) UL (ref 1.5–7.7)
NEUTROPHILS NFR BLD AUTO: 74.2 %
NT-PROBNP SERPL-MCNC: 495 PG/ML (ref ?–125)
OSMOLALITY SERPL CALC.SUM OF ELEC: 268 MOSM/KG (ref 275–295)
PLATELET # BLD AUTO: 120 10(3)UL (ref 150–450)
PLATELETS.RETICULATED NFR BLD AUTO: 7.5 % (ref 0–7)
POCT INFLUENZA A: NEGATIVE
POCT INFLUENZA B: NEGATIVE
POTASSIUM SERPL-SCNC: 3.3 MMOL/L (ref 3.5–5.1)
PROT SERPL-MCNC: 8.5 G/DL (ref 6.4–8.2)
RBC # BLD AUTO: 4.67 X10(6)UL
SARS-COV-2 RNA RESP QL NAA+PROBE: NOT DETECTED
SODIUM SERPL-SCNC: 127 MMOL/L (ref 136–145)
TROPONIN I SERPL HS-MCNC: 11 NG/L
WBC # BLD AUTO: 1.9 X10(3) UL (ref 4–11)

## 2024-10-16 PROCEDURE — 71275 CT ANGIOGRAPHY CHEST: CPT | Performed by: EMERGENCY MEDICINE

## 2024-10-16 PROCEDURE — 74177 CT ABD & PELVIS W/CONTRAST: CPT | Performed by: EMERGENCY MEDICINE

## 2024-10-16 PROCEDURE — 99223 1ST HOSP IP/OBS HIGH 75: CPT | Performed by: STUDENT IN AN ORGANIZED HEALTH CARE EDUCATION/TRAINING PROGRAM

## 2024-10-16 PROCEDURE — 71045 X-RAY EXAM CHEST 1 VIEW: CPT | Performed by: EMERGENCY MEDICINE

## 2024-10-16 RX ORDER — BISACODYL 10 MG
10 SUPPOSITORY, RECTAL RECTAL
Status: DISCONTINUED | OUTPATIENT
Start: 2024-10-16 | End: 2024-10-19

## 2024-10-16 RX ORDER — HYDROMORPHONE HYDROCHLORIDE 1 MG/ML
0.8 INJECTION, SOLUTION INTRAMUSCULAR; INTRAVENOUS; SUBCUTANEOUS EVERY 4 HOURS PRN
Status: DISCONTINUED | OUTPATIENT
Start: 2024-10-16 | End: 2024-10-19

## 2024-10-16 RX ORDER — ACETAMINOPHEN 10 MG/ML
1000 INJECTION, SOLUTION INTRAVENOUS EVERY 6 HOURS PRN
Status: DISCONTINUED | OUTPATIENT
Start: 2024-10-16 | End: 2024-10-19

## 2024-10-16 RX ORDER — ECHINACEA PURPUREA EXTRACT 125 MG
1 TABLET ORAL
Status: DISCONTINUED | OUTPATIENT
Start: 2024-10-16 | End: 2024-10-19

## 2024-10-16 RX ORDER — NICOTINE POLACRILEX 4 MG
30 LOZENGE BUCCAL
Status: DISCONTINUED | OUTPATIENT
Start: 2024-10-16 | End: 2024-10-19

## 2024-10-16 RX ORDER — METOCLOPRAMIDE HYDROCHLORIDE 5 MG/ML
5 INJECTION INTRAMUSCULAR; INTRAVENOUS EVERY 8 HOURS PRN
Status: DISCONTINUED | OUTPATIENT
Start: 2024-10-16 | End: 2024-10-19

## 2024-10-16 RX ORDER — METRONIDAZOLE 500 MG/100ML
500 INJECTION, SOLUTION INTRAVENOUS ONCE
Status: COMPLETED | OUTPATIENT
Start: 2024-10-16 | End: 2024-10-16

## 2024-10-16 RX ORDER — POTASSIUM CHLORIDE 1500 MG/1
20 TABLET, EXTENDED RELEASE ORAL ONCE
Status: COMPLETED | OUTPATIENT
Start: 2024-10-16 | End: 2024-10-16

## 2024-10-16 RX ORDER — SODIUM CHLORIDE 9 MG/ML
INJECTION, SOLUTION INTRAVENOUS ONCE
Status: COMPLETED | OUTPATIENT
Start: 2024-10-16 | End: 2024-10-16

## 2024-10-16 RX ORDER — LEVOFLOXACIN 5 MG/ML
500 INJECTION, SOLUTION INTRAVENOUS ONCE
Status: COMPLETED | OUTPATIENT
Start: 2024-10-16 | End: 2024-10-16

## 2024-10-16 RX ORDER — SODIUM CHLORIDE 9 MG/ML
INJECTION, SOLUTION INTRAVENOUS CONTINUOUS
Status: CANCELLED | OUTPATIENT
Start: 2024-10-16 | End: 2024-10-16

## 2024-10-16 RX ORDER — SODIUM PHOSPHATE, DIBASIC AND SODIUM PHOSPHATE, MONOBASIC 7; 19 G/230ML; G/230ML
1 ENEMA RECTAL ONCE AS NEEDED
Status: DISCONTINUED | OUTPATIENT
Start: 2024-10-16 | End: 2024-10-19

## 2024-10-16 RX ORDER — ONDANSETRON 2 MG/ML
4 INJECTION INTRAMUSCULAR; INTRAVENOUS ONCE
Status: COMPLETED | OUTPATIENT
Start: 2024-10-16 | End: 2024-10-16

## 2024-10-16 RX ORDER — NICOTINE POLACRILEX 4 MG
15 LOZENGE BUCCAL
Status: DISCONTINUED | OUTPATIENT
Start: 2024-10-16 | End: 2024-10-19

## 2024-10-16 RX ORDER — ONDANSETRON 2 MG/ML
4 INJECTION INTRAMUSCULAR; INTRAVENOUS EVERY 6 HOURS PRN
Status: DISCONTINUED | OUTPATIENT
Start: 2024-10-16 | End: 2024-10-19

## 2024-10-16 RX ORDER — DEXTROSE MONOHYDRATE 25 G/50ML
50 INJECTION, SOLUTION INTRAVENOUS
Status: DISCONTINUED | OUTPATIENT
Start: 2024-10-16 | End: 2024-10-19

## 2024-10-16 RX ORDER — ACETAMINOPHEN 500 MG
1000 TABLET ORAL ONCE
Status: COMPLETED | OUTPATIENT
Start: 2024-10-16 | End: 2024-10-16

## 2024-10-16 RX ORDER — IPRATROPIUM BROMIDE AND ALBUTEROL SULFATE 2.5; .5 MG/3ML; MG/3ML
3 SOLUTION RESPIRATORY (INHALATION) ONCE
Status: DISCONTINUED | OUTPATIENT
Start: 2024-10-16 | End: 2024-10-16

## 2024-10-16 RX ORDER — POLYETHYLENE GLYCOL 3350 17 G/17G
17 POWDER, FOR SOLUTION ORAL DAILY PRN
Status: DISCONTINUED | OUTPATIENT
Start: 2024-10-16 | End: 2024-10-19

## 2024-10-16 RX ORDER — IPRATROPIUM BROMIDE AND ALBUTEROL SULFATE 2.5; .5 MG/3ML; MG/3ML
3 SOLUTION RESPIRATORY (INHALATION) ONCE
Status: COMPLETED | OUTPATIENT
Start: 2024-10-16 | End: 2024-10-16

## 2024-10-16 RX ORDER — HYDROMORPHONE HYDROCHLORIDE 1 MG/ML
0.4 INJECTION, SOLUTION INTRAMUSCULAR; INTRAVENOUS; SUBCUTANEOUS EVERY 4 HOURS PRN
Status: DISCONTINUED | OUTPATIENT
Start: 2024-10-16 | End: 2024-10-19

## 2024-10-16 RX ORDER — ALBUTEROL SULFATE 5 MG/ML
10 SOLUTION RESPIRATORY (INHALATION) ONCE
Status: DISCONTINUED | OUTPATIENT
Start: 2024-10-16 | End: 2024-10-16

## 2024-10-16 RX ORDER — SENNOSIDES 8.6 MG
17.2 TABLET ORAL NIGHTLY PRN
Status: DISCONTINUED | OUTPATIENT
Start: 2024-10-16 | End: 2024-10-19

## 2024-10-16 RX ORDER — HYDROMORPHONE HYDROCHLORIDE 1 MG/ML
0.2 INJECTION, SOLUTION INTRAMUSCULAR; INTRAVENOUS; SUBCUTANEOUS EVERY 4 HOURS PRN
Status: DISCONTINUED | OUTPATIENT
Start: 2024-10-16 | End: 2024-10-19

## 2024-10-17 ENCOUNTER — ANESTHESIA EVENT (OUTPATIENT)
Dept: SURGERY | Facility: HOSPITAL | Age: 74
End: 2024-10-17
Payer: MEDICARE

## 2024-10-17 ENCOUNTER — APPOINTMENT (OUTPATIENT)
Dept: GENERAL RADIOLOGY | Facility: HOSPITAL | Age: 74
End: 2024-10-17
Attending: SURGERY
Payer: MEDICARE

## 2024-10-17 ENCOUNTER — ANESTHESIA (OUTPATIENT)
Dept: SURGERY | Facility: HOSPITAL | Age: 74
End: 2024-10-17
Payer: MEDICARE

## 2024-10-17 PROBLEM — E11.9 DIABETES MELLITUS TYPE 2 IN NONOBESE (HCC): Status: ACTIVE | Noted: 2024-10-17

## 2024-10-17 PROBLEM — K80.00 CALCULUS OF GALLBLADDER WITH ACUTE CHOLECYSTITIS WITHOUT OBSTRUCTION: Status: ACTIVE | Noted: 2024-10-17

## 2024-10-17 LAB
ALBUMIN SERPL-MCNC: 3.4 G/DL (ref 3.2–4.8)
ALBUMIN/GLOB SERPL: 1.3 {RATIO} (ref 1–2)
ALP LIVER SERPL-CCNC: 90 U/L
ALT SERPL-CCNC: 22 U/L
ANION GAP SERPL CALC-SCNC: 3 MMOL/L (ref 0–18)
AST SERPL-CCNC: 30 U/L (ref ?–34)
ATRIAL RATE: 115 BPM
BASOPHILS # BLD AUTO: 0.03 X10(3) UL (ref 0–0.2)
BASOPHILS NFR BLD AUTO: 0.3 %
BILIRUB SERPL-MCNC: 1.3 MG/DL (ref 0.2–1.1)
BILIRUB UR QL STRIP.AUTO: NEGATIVE
BUN BLD-MCNC: 20 MG/DL (ref 9–23)
C DIFF TOX B STL QL: NEGATIVE
CALCIUM BLD-MCNC: 8.6 MG/DL (ref 8.7–10.4)
CHLORIDE SERPL-SCNC: 99 MMOL/L (ref 98–112)
CLARITY UR REFRACT.AUTO: CLEAR
CO2 SERPL-SCNC: 26 MMOL/L (ref 21–32)
CREAT BLD-MCNC: 1.34 MG/DL
EGFRCR SERPLBLD CKD-EPI 2021: 56 ML/MIN/1.73M2 (ref 60–?)
EOSINOPHIL # BLD AUTO: 0.02 X10(3) UL (ref 0–0.7)
EOSINOPHIL NFR BLD AUTO: 0.2 %
ERYTHROCYTE [DISTWIDTH] IN BLOOD BY AUTOMATED COUNT: 14.3 %
GLOBULIN PLAS-MCNC: 2.7 G/DL (ref 2–3.5)
GLUCOSE BLD-MCNC: 153 MG/DL (ref 70–99)
GLUCOSE BLD-MCNC: 194 MG/DL (ref 70–99)
GLUCOSE BLD-MCNC: 195 MG/DL (ref 70–99)
GLUCOSE BLD-MCNC: 196 MG/DL (ref 70–99)
GLUCOSE BLD-MCNC: 203 MG/DL (ref 70–99)
GLUCOSE BLD-MCNC: 204 MG/DL (ref 70–99)
GLUCOSE BLD-MCNC: 207 MG/DL (ref 70–99)
GLUCOSE UR STRIP.AUTO-MCNC: NORMAL MG/DL
HCT VFR BLD AUTO: 30.3 %
HGB BLD-MCNC: 10.9 G/DL
IMM GRANULOCYTES # BLD AUTO: 0.16 X10(3) UL (ref 0–1)
IMM GRANULOCYTES NFR BLD: 1.6 %
LACTATE SERPL-SCNC: 2.3 MMOL/L (ref 0.5–2)
LACTATE SERPL-SCNC: 3.2 MMOL/L (ref 0.5–2)
LEUKOCYTE ESTERASE UR QL STRIP.AUTO: NEGATIVE
LYMPHOCYTES # BLD AUTO: 0.25 X10(3) UL (ref 1–4)
LYMPHOCYTES NFR BLD AUTO: 2.5 %
MAGNESIUM SERPL-MCNC: 1.5 MG/DL (ref 1.6–2.6)
MCH RBC QN AUTO: 29.9 PG (ref 26–34)
MCHC RBC AUTO-ENTMCNC: 36 G/DL (ref 31–37)
MCV RBC AUTO: 83 FL
MONOCYTES # BLD AUTO: 1 X10(3) UL (ref 0.1–1)
MONOCYTES NFR BLD AUTO: 10 %
NEUTROPHILS # BLD AUTO: 8.56 X10 (3) UL (ref 1.5–7.7)
NEUTROPHILS # BLD AUTO: 8.56 X10(3) UL (ref 1.5–7.7)
NEUTROPHILS NFR BLD AUTO: 85.4 %
NITRITE UR QL STRIP.AUTO: NEGATIVE
OSMOLALITY SERPL CALC.SUM OF ELEC: 274 MOSM/KG (ref 275–295)
OSMOLALITY UR: 707 MOSM/KG (ref 300–1300)
P AXIS: 43 DEGREES
P-R INTERVAL: 194 MS
PH UR STRIP.AUTO: 6 [PH] (ref 5–8)
PLATELET # BLD AUTO: 93 10(3)UL (ref 150–450)
PLATELET MORPHOLOGY: NORMAL
PLATELETS.RETICULATED NFR BLD AUTO: 8 % (ref 0–7)
POTASSIUM SERPL-SCNC: 4.1 MMOL/L (ref 3.5–5.1)
PROT SERPL-MCNC: 6.1 G/DL (ref 5.7–8.2)
PROT UR STRIP.AUTO-MCNC: 100 MG/DL
Q-T INTERVAL: 306 MS
QRS DURATION: 98 MS
QTC CALCULATION (BEZET): 423 MS
R AXIS: -67 DEGREES
RBC # BLD AUTO: 3.65 X10(6)UL
SODIUM SERPL-SCNC: 128 MMOL/L (ref 136–145)
SODIUM SERPL-SCNC: <10 MMOL/L
SP GR UR STRIP.AUTO: >1.03 (ref 1–1.03)
T AXIS: 70 DEGREES
UROBILINOGEN UR STRIP.AUTO-MCNC: 8 MG/DL
VENTRICULAR RATE: 115 BPM
WBC # BLD AUTO: 10 X10(3) UL (ref 4–11)
YEAST UR QL: PRESENT /HPF

## 2024-10-17 PROCEDURE — BF50200 OTHER IMAGING OF BILE DUCTS USING FLUORESCING AGENT, INDOCYANINE GREEN DYE, INTRAOPERATIVE: ICD-10-PCS | Performed by: SURGERY

## 2024-10-17 PROCEDURE — 47563 LAPARO CHOLECYSTECTOMY/GRAPH: CPT

## 2024-10-17 PROCEDURE — 99223 1ST HOSP IP/OBS HIGH 75: CPT | Performed by: SURGERY

## 2024-10-17 PROCEDURE — BF101ZZ FLUOROSCOPY OF BILE DUCTS USING LOW OSMOLAR CONTRAST: ICD-10-PCS | Performed by: SURGERY

## 2024-10-17 PROCEDURE — 0FT44ZZ RESECTION OF GALLBLADDER, PERCUTANEOUS ENDOSCOPIC APPROACH: ICD-10-PCS | Performed by: SURGERY

## 2024-10-17 PROCEDURE — 74300 X-RAY BILE DUCTS/PANCREAS: CPT | Performed by: SURGERY

## 2024-10-17 PROCEDURE — 0WQF4ZZ REPAIR ABDOMINAL WALL, PERCUTANEOUS ENDOSCOPIC APPROACH: ICD-10-PCS | Performed by: SURGERY

## 2024-10-17 PROCEDURE — 47563 LAPARO CHOLECYSTECTOMY/GRAPH: CPT | Performed by: SURGERY

## 2024-10-17 PROCEDURE — 99233 SBSQ HOSP IP/OBS HIGH 50: CPT | Performed by: INTERNAL MEDICINE

## 2024-10-17 RX ORDER — ONDANSETRON 2 MG/ML
INJECTION INTRAMUSCULAR; INTRAVENOUS
Status: COMPLETED
Start: 2024-10-17 | End: 2024-10-17

## 2024-10-17 RX ORDER — MIDAZOLAM HYDROCHLORIDE 1 MG/ML
INJECTION INTRAMUSCULAR; INTRAVENOUS AS NEEDED
Status: DISCONTINUED | OUTPATIENT
Start: 2024-10-17 | End: 2024-10-17 | Stop reason: SURG

## 2024-10-17 RX ORDER — HYDROMORPHONE HYDROCHLORIDE 1 MG/ML
INJECTION, SOLUTION INTRAMUSCULAR; INTRAVENOUS; SUBCUTANEOUS
Status: COMPLETED
Start: 2024-10-17 | End: 2024-10-17

## 2024-10-17 RX ORDER — PHENYLEPHRINE HCL 10 MG/ML
VIAL (ML) INJECTION AS NEEDED
Status: DISCONTINUED | OUTPATIENT
Start: 2024-10-17 | End: 2024-10-17 | Stop reason: SURG

## 2024-10-17 RX ORDER — NEOSTIGMINE METHYLSULFATE 1 MG/ML
INJECTION INTRAVENOUS AS NEEDED
Status: DISCONTINUED | OUTPATIENT
Start: 2024-10-17 | End: 2024-10-17 | Stop reason: SURG

## 2024-10-17 RX ORDER — SODIUM CHLORIDE, SODIUM LACTATE, POTASSIUM CHLORIDE, CALCIUM CHLORIDE 600; 310; 30; 20 MG/100ML; MG/100ML; MG/100ML; MG/100ML
INJECTION, SOLUTION INTRAVENOUS CONTINUOUS
Status: DISCONTINUED | OUTPATIENT
Start: 2024-10-17 | End: 2024-10-17 | Stop reason: HOSPADM

## 2024-10-17 RX ORDER — NALOXONE HYDROCHLORIDE 0.4 MG/ML
0.08 INJECTION, SOLUTION INTRAMUSCULAR; INTRAVENOUS; SUBCUTANEOUS AS NEEDED
Status: DISCONTINUED | OUTPATIENT
Start: 2024-10-17 | End: 2024-10-17 | Stop reason: HOSPADM

## 2024-10-17 RX ORDER — INDOCYANINE GREEN AND WATER 25 MG
5 KIT INJECTION ONCE
Status: COMPLETED | OUTPATIENT
Start: 2024-10-17 | End: 2024-10-17

## 2024-10-17 RX ORDER — INSULIN ASPART 100 [IU]/ML
INJECTION, SOLUTION INTRAVENOUS; SUBCUTANEOUS ONCE
Status: DISCONTINUED | OUTPATIENT
Start: 2024-10-17 | End: 2024-10-17 | Stop reason: HOSPADM

## 2024-10-17 RX ORDER — BUPIVACAINE HYDROCHLORIDE AND EPINEPHRINE 5; 5 MG/ML; UG/ML
INJECTION, SOLUTION EPIDURAL; INTRACAUDAL; PERINEURAL AS NEEDED
Status: DISCONTINUED | OUTPATIENT
Start: 2024-10-17 | End: 2024-10-17 | Stop reason: HOSPADM

## 2024-10-17 RX ORDER — KETAMINE HYDROCHLORIDE 50 MG/ML
INJECTION, SOLUTION INTRAMUSCULAR; INTRAVENOUS AS NEEDED
Status: DISCONTINUED | OUTPATIENT
Start: 2024-10-17 | End: 2024-10-17 | Stop reason: SURG

## 2024-10-17 RX ORDER — HYDROMORPHONE HYDROCHLORIDE 1 MG/ML
0.6 INJECTION, SOLUTION INTRAMUSCULAR; INTRAVENOUS; SUBCUTANEOUS EVERY 5 MIN PRN
Status: DISCONTINUED | OUTPATIENT
Start: 2024-10-17 | End: 2024-10-17 | Stop reason: HOSPADM

## 2024-10-17 RX ORDER — SODIUM CHLORIDE, SODIUM LACTATE, POTASSIUM CHLORIDE, CALCIUM CHLORIDE 600; 310; 30; 20 MG/100ML; MG/100ML; MG/100ML; MG/100ML
INJECTION, SOLUTION INTRAVENOUS CONTINUOUS
Status: DISCONTINUED | OUTPATIENT
Start: 2024-10-17 | End: 2024-10-17

## 2024-10-17 RX ORDER — DEXAMETHASONE SODIUM PHOSPHATE 4 MG/ML
VIAL (ML) INJECTION AS NEEDED
Status: DISCONTINUED | OUTPATIENT
Start: 2024-10-17 | End: 2024-10-17 | Stop reason: SURG

## 2024-10-17 RX ORDER — PANTOPRAZOLE SODIUM 20 MG/1
20 TABLET, DELAYED RELEASE ORAL
Status: DISCONTINUED | OUTPATIENT
Start: 2024-10-17 | End: 2024-10-18

## 2024-10-17 RX ORDER — HYDROMORPHONE HYDROCHLORIDE 1 MG/ML
0.4 INJECTION, SOLUTION INTRAMUSCULAR; INTRAVENOUS; SUBCUTANEOUS EVERY 5 MIN PRN
Status: DISCONTINUED | OUTPATIENT
Start: 2024-10-17 | End: 2024-10-17 | Stop reason: HOSPADM

## 2024-10-17 RX ORDER — ACETAMINOPHEN 10 MG/ML
1000 INJECTION, SOLUTION INTRAVENOUS ONCE
Status: COMPLETED | OUTPATIENT
Start: 2024-10-17 | End: 2024-10-17

## 2024-10-17 RX ORDER — GLYCOPYRROLATE 0.2 MG/ML
INJECTION, SOLUTION INTRAMUSCULAR; INTRAVENOUS AS NEEDED
Status: DISCONTINUED | OUTPATIENT
Start: 2024-10-17 | End: 2024-10-17 | Stop reason: SURG

## 2024-10-17 RX ORDER — ONDANSETRON 2 MG/ML
4 INJECTION INTRAMUSCULAR; INTRAVENOUS EVERY 6 HOURS PRN
Status: DISCONTINUED | OUTPATIENT
Start: 2024-10-17 | End: 2024-10-17 | Stop reason: HOSPADM

## 2024-10-17 RX ORDER — SODIUM CHLORIDE 9 MG/ML
INJECTION, SOLUTION INTRAVENOUS CONTINUOUS PRN
Status: DISCONTINUED | OUTPATIENT
Start: 2024-10-17 | End: 2024-10-17 | Stop reason: SURG

## 2024-10-17 RX ORDER — LIDOCAINE HYDROCHLORIDE 10 MG/ML
INJECTION, SOLUTION EPIDURAL; INFILTRATION; INTRACAUDAL; PERINEURAL AS NEEDED
Status: DISCONTINUED | OUTPATIENT
Start: 2024-10-17 | End: 2024-10-17 | Stop reason: SURG

## 2024-10-17 RX ORDER — SODIUM CHLORIDE 9 MG/ML
INJECTION, SOLUTION INTRAVENOUS CONTINUOUS
Status: DISCONTINUED | OUTPATIENT
Start: 2024-10-17 | End: 2024-10-19

## 2024-10-17 RX ORDER — ACETAMINOPHEN 10 MG/ML
INJECTION, SOLUTION INTRAVENOUS
Status: COMPLETED
Start: 2024-10-17 | End: 2024-10-18

## 2024-10-17 RX ORDER — ACETAMINOPHEN 10 MG/ML
1000 INJECTION, SOLUTION INTRAVENOUS EVERY 6 HOURS
Status: DISCONTINUED | OUTPATIENT
Start: 2024-10-17 | End: 2024-10-19

## 2024-10-17 RX ORDER — ROCURONIUM BROMIDE 10 MG/ML
INJECTION, SOLUTION INTRAVENOUS AS NEEDED
Status: DISCONTINUED | OUTPATIENT
Start: 2024-10-17 | End: 2024-10-17 | Stop reason: SURG

## 2024-10-17 RX ORDER — ONDANSETRON 2 MG/ML
INJECTION INTRAMUSCULAR; INTRAVENOUS AS NEEDED
Status: DISCONTINUED | OUTPATIENT
Start: 2024-10-17 | End: 2024-10-17 | Stop reason: SURG

## 2024-10-17 RX ORDER — HYDROMORPHONE HYDROCHLORIDE 1 MG/ML
0.2 INJECTION, SOLUTION INTRAMUSCULAR; INTRAVENOUS; SUBCUTANEOUS EVERY 5 MIN PRN
Status: DISCONTINUED | OUTPATIENT
Start: 2024-10-17 | End: 2024-10-17 | Stop reason: HOSPADM

## 2024-10-17 RX ADMIN — PHENYLEPHRINE HCL 100 MCG: 10 MG/ML VIAL (ML) INJECTION at 12:24:00

## 2024-10-17 RX ADMIN — SODIUM CHLORIDE, SODIUM LACTATE, POTASSIUM CHLORIDE, CALCIUM CHLORIDE: 600; 310; 30; 20 INJECTION, SOLUTION INTRAVENOUS at 11:26:00

## 2024-10-17 RX ADMIN — INDOCYANINE GREEN AND WATER 7.5 MG: 25 MG KIT INJECTION at 11:18:00

## 2024-10-17 RX ADMIN — NEOSTIGMINE METHYLSULFATE 5 MG: 1 INJECTION INTRAVENOUS at 13:31:00

## 2024-10-17 RX ADMIN — PHENYLEPHRINE HCL 100 MCG: 10 MG/ML VIAL (ML) INJECTION at 12:18:00

## 2024-10-17 RX ADMIN — ONDANSETRON 4 MG: 2 INJECTION INTRAMUSCULAR; INTRAVENOUS at 13:39:00

## 2024-10-17 RX ADMIN — DEXAMETHASONE SODIUM PHOSPHATE 4 MG: 4 MG/ML VIAL (ML) INJECTION at 11:50:00

## 2024-10-17 RX ADMIN — ROCURONIUM BROMIDE 30 MG: 10 INJECTION, SOLUTION INTRAVENOUS at 12:42:00

## 2024-10-17 RX ADMIN — PHENYLEPHRINE HCL 100 MCG: 10 MG/ML VIAL (ML) INJECTION at 12:43:00

## 2024-10-17 RX ADMIN — MIDAZOLAM HYDROCHLORIDE 2 MG: 1 INJECTION INTRAMUSCULAR; INTRAVENOUS at 11:28:00

## 2024-10-17 RX ADMIN — KETAMINE HYDROCHLORIDE 50 MG: 50 INJECTION, SOLUTION INTRAMUSCULAR; INTRAVENOUS at 12:04:00

## 2024-10-17 RX ADMIN — LIDOCAINE HYDROCHLORIDE 50 MG: 10 INJECTION, SOLUTION EPIDURAL; INFILTRATION; INTRACAUDAL; PERINEURAL at 11:36:00

## 2024-10-17 RX ADMIN — PHENYLEPHRINE HCL 100 MCG: 10 MG/ML VIAL (ML) INJECTION at 12:32:00

## 2024-10-17 RX ADMIN — ROCURONIUM BROMIDE 70 MG: 10 INJECTION, SOLUTION INTRAVENOUS at 11:36:00

## 2024-10-17 RX ADMIN — GLYCOPYRROLATE 0.4 MG: 0.2 INJECTION, SOLUTION INTRAMUSCULAR; INTRAVENOUS at 13:31:00

## 2024-10-17 RX ADMIN — PHENYLEPHRINE HCL 100 MCG: 10 MG/ML VIAL (ML) INJECTION at 12:12:00

## 2024-10-17 RX ADMIN — SODIUM CHLORIDE: 9 INJECTION, SOLUTION INTRAVENOUS at 12:11:00

## 2024-10-17 RX ADMIN — PHENYLEPHRINE HCL 50 MCG: 10 MG/ML VIAL (ML) INJECTION at 12:03:00

## 2024-10-17 NOTE — PROGRESS NOTES
[facility]   part of [facility]     Hospitalist Progress Note     Buddy Meek Patient Status:  Inpatient    1950 MRN [id number]   Location [facility] 3NW-A Attending Jazlyn Goff MD   Hosp Day # 1 PCP Chantelle Enamorado MD     Chief Complaint: Dyspnea    Subjective:     Tmax 102.8 on arrival to the ED. HR and temps improving. Normotensive. Denies pain.     Objective:    Review of Systems:   A comprehensive review of systems was completed; pertinent positive and negatives stated in subjective.    Vital signs:  Temp:  [97.6 °F (36.4 °C)-102.8 °F (39.3 °C)] 97.6 °F (36.4 °C)  Pulse:  [] 75  Resp:  [18-34] 18  BP: (106-135)/(55-93) 120/71  SpO2:  [92 %-98 %] 97 %    Physical Exam:    General: No acute distress  Respiratory: No wheezes, no rhonchi  Cardiovascular: S1, S2, regular rate and rhythm  Abdomen: Soft, Non-tender, non-distended, positive bowel sounds  Neuro: No new focal deficits.   Extremities: No edema    Diagnostic Data:    Labs:  Recent Labs   Lab 10/16/24  1933 10/17/24  0402   WBC 1.9* 10.0   HGB 14.0 10.9*   MCV 86.9 83.0   .0* 93.0*       Recent Labs   Lab 10/16/24  1933 10/17/24  0402   * 204*   BUN 18 20   CREATSERUM 1.72* 1.34*   CA 9.3 8.6*   ALB 3.2* 3.4   * 128*   K 3.3* 4.1   CL 91* 99   CO2 26.0 26.0   ALKPHO 105 90   AST 29 30   ALT 32 22   BILT 1.6 1.3*   TP 8.5* 6.1       Estimated Creatinine Clearance: 57.1 mL/min (A) (based on SCr of 1.34 mg/dL (H)).    Recent Labs   Lab 10/16/24  1933   TROPHS 11       No results for input(s): \"PTP\", \"INR\" in the last 168 hours.               Microbiology    No results found for this visit on 10/16/24.      Imaging: Reviewed in Epic.    Medications:    pantoprazole  20 mg Oral QAM AC    magnesium sulfate  4 g Intravenous Once    piperacillin-tazobactam  3.375 g Intravenous Q8H TRACY    insulin aspart  2-10 Units Subcutaneous TID AC and HS       Assessment & Plan:      #Severe sepsis 2/2 acute  cholecystitis  #lactic acidosis  #Leukopenia - resolved  Presented with several day history of abdominal pain, nausea. CTA chest A/P showing acute cholecystitis, small hypoattenuating focus within wall of gallbladder fundus concerning for focis of wall peforation, mild reactive duodenitis.  -Bcx NGTD  -Continue zosyn  -general surgery consulted   -Supportive care     #Normocytic anemia  Hgb 14 - > 10.9 today   -Check iron studies, B12, folic acid     #Hyponatremia  #hypokalemia  -Trend     #hypoalbuminemia     #thrombocytopenia  2/2 sepsis. Trend.      #CKD 3a  -Cr at baseline     #DM  -SSI, Q6h checks, hypoglycemia protocol  -hold home metformin     #HTN  -hold home amlodipine and enalapril for now given initial borderline low BP and restart as able     #GERD  -cont home PPI      Luz Marina Bullock MD    Supplementary Documentation:     Quality:  DVT Mechanical Prophylaxis:   SCDs,    DVT Pharmacologic Prophylaxis   Medication   None                Code Status: Not on file  Yuan: No urinary catheter in place  Yuan Duration (in days):   Central line:    LIANE:     Discharge is dependent on: course  At this point Mr. Meek is expected to be discharge to: home    The 21st Century Cures Act makes medical notes like these available to patients in the interest of transparency. Please be advised this is a medical document. Medical documents are intended to carry relevant information, facts as evident, and the clinical opinion of the practitioner. The medical note is intended as peer to peer communication and may appear blunt or direct. It is written in medical language and may contain abbreviations or verbiage that are unfamiliar.

## 2024-10-17 NOTE — PROGRESS NOTES
NURSING ADMISSION NOTE      Patient admitted via Ambulance  Oriented to room.  Safety precautions initiated.  Bed in low position.  Call light in reach.    Pt is alert and oriented, vss, denies pain. Abd is softly distended, hypoactive.   Skin is WNL, checked with PCT Kristie present.

## 2024-10-17 NOTE — ANESTHESIA POSTPROCEDURE EVALUATION
[facility]    Buddy Meek Patient Status:  Inpatient   Age/Gender 74 year old male MRN [id number]   Location [facility] POST ANESTHESIA CARE UNIT Attending Jazlyn Goff MD   Hosp Day # 1 PCP Chantelle Enamorado MD       Anesthesia Post-op Note    LAPAROSCOPIC CHOLECYSTECTOMY WITH INDOCYANINE GREEN AND INTRAOPERATIVE CHOLANGIOGRAM, REPAIR OF UMBILICAL HERNIA    Procedure Summary       Date: 10/17/24 Room / Location:  MAIN OR 15 / [facility] MAIN OR    Anesthesia Start: 1126 Anesthesia Stop: 1401    Procedure: LAPAROSCOPIC CHOLECYSTECTOMY WITH INDOCYANINE GREEN AND INTRAOPERATIVE CHOLANGIOGRAM, REPAIR OF UMBILICAL HERNIA (Abdomen) Diagnosis:       Cholecystitis      (Cholecystitis, cholelithiasis, Empyema, Umbilical hernia,  [K81.9])    Surgeons: Ginette Alvarado MD Anesthesiologist: Juan Carlos Jacobs DO    Anesthesia Type: general ASA Status: 3            Anesthesia Type: general    Vitals Value Taken Time   /65 10/17/24 1402   Temp 97.4 10/17/24 1402   Pulse 86 10/17/24 1402   Resp 13 10/17/24 1402   SpO2 94 10/17/24 1402       Patient Location: PACU    Anesthesia Type: general    Airway Patency: patent    Postop Pain Control: adequate    Mental Status: preanesthetic baseline    Nausea/Vomiting: none    Cardiopulmonary/Hydration status: stable euvolemic    Complications: no apparent anesthesia related complications    Postop vital signs: stable    Dental Exam: Unchanged from Preop    Patient to be discharged from PACU when criteria met.

## 2024-10-17 NOTE — H&P
[facility]     Buddy Meek Patient Status:  Inpatient    1950 MRN [id number]   Location [facility] 3NW-A Attending Jazlyn Goff MD   Hosp Day # 0 PCP Chantelle Enamorado MD     Chief Complaint: dyspnea    Subjective:    History of Present Illness:     Buddy Meek is a 74 year old male with PMHx BPH/ DM/ HTN who presented to the hospital for dyspnea. He had intermittent diffuse abdominal bloating for the past 5 days as well as nausea and emesis. He was still able to eat a little and keep down fluids for the most part. This afternoon he suddenly developed shaking chills as well as dyspnea and this decided to seek medical eval. He denied any fever, bloody stools, diarrhea, constipation. He notes issues with his gallbladder about 30 years ago but has not had problems since then.    History/Other:    Past Medical History:  Past Medical History:    BPH (benign prostatic hyperplasia)    Diabetes (HCC)    Essential hypertension     Past Surgical History:   Past Surgical History:   Procedure Laterality Date    Appendectomy        Family History:   History reviewed. No pertinent family history.  Social History:    reports that he has never smoked. He has never used smokeless tobacco. He reports that he does not drink alcohol and does not use drugs.     Allergies: Allergies[1]    Medications:  Medications Ordered Prior to Encounter[2]    Review of Systems:   A comprehensive review of systems was completed.    Pertinent positives and negatives noted in the HPI.    Objective:   Physical Exam:    /70   Pulse 109   Temp (!) 100.9 °F (38.3 °C) (Oral)   Resp 18   Ht 6' 2\" (1.88 m)   Wt 220 lb (99.8 kg)   SpO2 98%   BMI 28.25 kg/m²   General: No acute distress, Alert  Respiratory: No rhonchi, no wheezes, on room air  Cardiovascular: S1, S2. Regular rate and rhythm  Abdomen: Soft, Non-tender, non-distended, positive bowel sounds  Neuro: No new focal deficits  Extremities: No  edema    Results:    Labs:      Labs Last 24 Hours:    Recent Labs   Lab 10/16/24  1933   RBC 4.67   HGB 14.0   HCT 40.6   MCV 86.9   MCH 30.0   MCHC 34.5   RDW 14.6   NEPRELIM 1.44*   WBC 1.9*   .0*       Recent Labs   Lab 10/16/24  1933   *   BUN 18   CREATSERUM 1.72*   EGFRCR 41*   CA 9.3   ALB 3.2*   *   K 3.3*   CL 91*   CO2 26.0   ALKPHO 105   AST 29   ALT 32   BILT 1.6   TP 8.5*       No results found for: \"PT\", \"INR\"    Recent Labs   Lab 10/16/24  1933   TROPHS 11       Recent Labs   Lab 10/16/24  1933   PBNP 495*       No results for input(s): \"PCT\" in the last 168 hours.    Imaging: Imaging data reviewed in Epic.    Assessment & Plan:      #Severe sepsis 2/2 acute cholecystitis  #lactic acidosis  #Leukopenia  -CTA chest A/P showing acute cholecystitis, small hypoattenuating focus within wall of gallbladder fundus concerning for focis of wall peforation, mild reactive duodenitis  -met 4 SIRS criteria: WBC 1.9, T 102.8, , RR 34 --> lactate 8.4  -SpO2 as low as 92% on room air  -antibiotics: Zosyn  -tylenol prn  -pain control: dilaudid prn  -NPO  -cont to trend lactate  -can finish 30 ml/kg bolus since ok for Na to correct to normal but will use LR to limit sodium content  -general surgery c/s in ED    #Hyponatremia  #hypokalemia  -Na corrects to 128 for hyperglycemia, K 3.3  -suspect multifactorial from Raul effect from hypokalemia, SIADH from pain/ nausea  -can allow to correct to normal over 24 hrs  -replete K and cont to monitor BMP  -check mg level    #hypoalbuminemia  -albumin 3.2    #thrombocytopenia  -plt 120  -no signs acute bleeding  -suspect 2/2 infection  -cont to monitor CBC    #CKD 3a  -Cr at baseline    #DM  -SSI, Q6h checks, hypoglycemia protocol  -hold home metformin    #HTN  -hold home amlodipine and enalapril for now given initial borderline low BP and restart as able    #GERD  -cont home PPI        Plan of care discussed with ED physician    Desirae Moseley,  DO    Supplementary Documentation:     The 21st Century Cures Act makes medical notes like these available to patients in the interest of transparency. Please be advised this is a medical document. Medical documents are intended to carry relevant information, facts as evident, and the clinical opinion of the practitioner. The medical note is intended as peer to peer communication and may appear blunt or direct. It is written in medical language and may contain abbreviations or verbiage that are unfamiliar.               **Certification      PHYSICIAN Certification of Need for Inpatient Hospitalization - Initial Certification    Patient will require inpatient services that will reasonably be expected to span two midnight's based on the clinical documentation in H+P.   Based on patients current state of illness, I anticipate that, after discharge, patient will require TBD.                        [1]   Allergies  Allergen Reactions    Rocephin [Ceftriaxone] HIVES   [2]   No current facility-administered medications on file prior to encounter.     Current Outpatient Medications on File Prior to Encounter   Medication Sig Dispense Refill    naproxen 500 MG Oral Tab Take 1 tablet (500 mg total) by mouth 2 (two) times daily as needed. 20 tablet 0    orphenadrine  MG Oral Tablet 12 Hr Take 100 mg by mouth 2 (two) times daily as needed (stiffness or spasm). 10 tablet 0    amLODIPine 10 MG Oral Tab Take 1 tablet (10 mg total) by mouth daily.      pantoprazole 20 MG Oral Tab EC Take 1 tablet (20 mg total) by mouth every morning before breakfast.      Enalapril Maleate 20 MG Oral Tab Take 1 tablet (20 mg total) by mouth daily.      MetFORMIN HCl 1000 MG Oral Tab Take 1 tablet (1,000 mg total) by mouth 2 (two) times daily with meals.

## 2024-10-17 NOTE — CONSULTS
[facility]  GASTROENTEROLOGY NEW CONSULT NOTE   [facility]     Buddy Meek Patient Status:  Inpatient    1950 MRN [id number]   Location [facility] 3NW-A Attending Jazlyn Goff MD   Hosp Day # 1 PCP Chantelle Enamorado MD       Reason for Consultation:   Abnormal IOC    History of Present Illness (HPI):  Buddy Meek is a 74 year old male with chronic medical conditions including diabetes hypertension, and BPH that presented to the ER yesterday with dyspnea, nausea, emesis, and abdominal bloating.  He was found to have a fever of 102.8 °F.  CT initially showed cholecystitis with perforated gallbladder.  He underwent cholecystectomy today and IOC with no evidence of choledocholithiasis but did have pancreatic duct dilatation with possible narrowing near the ampulla.  This may be related to pressurize injection of contrast material but further evaluation is recommended to exclude ampullary lesion.  AST, ALT, and alkaline phosphatase were normal.  Bilirubin this morning was 1.3.  He is seen postop and has mild right upper quadrant abdominal discomfort.    Past Medical History:  Past Medical History:    BPH (benign prostatic hyperplasia)    Diabetes (HCC)    Essential hypertension       Past Surgical History:  Past Surgical History:   Procedure Laterality Date    Appendectomy         Family History:  No first-degree relative with a history of colorectal cancer.    Social History:  No IVDU      Medications:    pantoprazole (Protonix) DR tab 20 mg  20 mg Oral QAM AC    [COMPLETED] magnesium sulfate 4 g/100mL IVPB premix 4 g  4 g Intravenous Once    [COMPLETED] indocyanine green (IC-Green) injection 5 mg  5 mg Intravenous Once    [COMPLETED] acetaminophen (Ofirmev) 10 mg/mL infusion premix 1,000 mg  1,000 mg Intravenous Once    acetaminophen (Ofirmev) 10 mg/mL infusion premix 1,000 mg  1,000 mg Intravenous Q6H    [COMPLETED] ondansetron (Zofran) 4 MG/2ML injection        [COMPLETED]  HYDROmorphone (Dilaudid) 1 MG/ML injection        [COMPLETED] acetaminophen (Ofirmev) 10 mg/mL infusion premix        sodium chloride 0.9% infusion   Intravenous Continuous    [COMPLETED] ipratropium-albuterol (Duoneb) 0.5-2.5 (3) MG/3ML inhalation solution 3 mL  3 mL Nebulization Once    [COMPLETED] acetaminophen (Tylenol Extra Strength) tab 1,000 mg  1,000 mg Oral Once    [COMPLETED] ondansetron (Zofran) 4 MG/2ML injection 4 mg  4 mg Intravenous Once    [COMPLETED] iopamidol 76% (ISOVUE-370) injection for power injector  100 mL Intravenous ONCE PRN    [COMPLETED] sodium chloride 0.9% infusion   Intravenous Once    [COMPLETED] potassium chloride (Klor-Con M20) tab 20 mEq  20 mEq Oral Once    [COMPLETED] levoFLOXacin in dextrose 5% (Levaquin) 500 mg/100mL IVPB premix 500 mg  500 mg Intravenous Once    [COMPLETED] metRONIDAZOLE in sodium chloride 0.79% (Flagyl) 5 mg/mL IVPB premix 500 mg  500 mg Intravenous Once    HYDROmorphone (Dilaudid) 1 MG/ML injection 0.2 mg  0.2 mg Intravenous Q4H PRN    Or    HYDROmorphone (Dilaudid) 1 MG/ML injection 0.4 mg  0.4 mg Intravenous Q4H PRN    Or    HYDROmorphone (Dilaudid) 1 MG/ML injection 0.8 mg  0.8 mg Intravenous Q4H PRN    polyethylene glycol (PEG 3350) (Miralax) 17 g oral packet 17 g  17 g Oral Daily PRN    sennosides (Senokot) tab 17.2 mg  17.2 mg Oral Nightly PRN    bisacodyl (Dulcolax) 10 MG rectal suppository 10 mg  10 mg Rectal Daily PRN    fleet enema (Fleet) rectal enema 133 mL  1 enema Rectal Once PRN    ondansetron (Zofran) 4 MG/2ML injection 4 mg  4 mg Intravenous Q6H PRN    metoclopramide (Reglan) 5 mg/mL injection 5 mg  5 mg Intravenous Q8H PRN    glycerin-hypromellose- (Artificial Tears) 0.2-0.2-1 % ophthalmic solution 1 drop  1 drop Both Eyes QID PRN    sodium chloride (Saline Mist) 0.65 % nasal solution 1 spray  1 spray Each Nare Q3H PRN    [COMPLETED] lactated ringers IV bolus 2,994 mL  30 mL/kg Intravenous Once    acetaminophen (Ofirmev) 10 mg/mL  infusion premix 1,000 mg  1,000 mg Intravenous Q6H PRN    piperacillin-tazobactam (Zosyn) 3.375 g in dextrose 5% 100 mL IVPB-ADDV  3.375 g Intravenous Q8H TRACY    glucose (Dex4) 15 GM/59ML oral liquid 15 g  15 g Oral Q15 Min PRN    Or    glucose (Glutose) 40% oral gel 15 g  15 g Oral Q15 Min PRN    Or    glucose-vitamin C (Dex-4) chewable tab 4 tablet  4 tablet Oral Q15 Min PRN    Or    dextrose 50% injection 50 mL  50 mL Intravenous Q15 Min PRN    Or    glucose (Dex4) 15 GM/59ML oral liquid 30 g  30 g Oral Q15 Min PRN    Or    glucose (Glutose) 40% oral gel 30 g  30 g Oral Q15 Min PRN    Or    glucose-vitamin C (Dex-4) chewable tab 8 tablet  8 tablet Oral Q15 Min PRN    insulin aspart (NovoLOG) 100 Units/mL FlexPen 2-10 Units  2-10 Units Subcutaneous TID AC and HS       Allergies:  Allergies[1]    Review of Systems  Negative unless otherwise mentioned in HPI.     Physical Exam  /60 (BP Location: Left arm)   Pulse 72   Temp 97.6 °F (36.4 °C) (Oral)   Resp 12   Ht 6' 2\" (1.88 m)   Wt 220 lb (99.8 kg)   SpO2 93%   BMI 28.25 kg/m²   Body mass index is 28.25 kg/m².      Gen: Awake and alert, NAD. family present in room  Eyes:  no scleral icterus  Cardiovascular: Warm, well-perfused  Respiratory: No respiratory distress  Abd: Soft, tender to palpation right upper quadrant, non-distended. No rebound tenderness, no guarding.  Neuro:  Alert and oriented to name, location and time.     Diagnostic Data:      Labs:  Recent Labs   Lab 10/16/24  1933 10/17/24  0402   WBC 1.9* 10.0   HGB 14.0 10.9*   MCV 86.9 83.0   .0* 93.0*       Recent Labs   Lab 10/16/24  1933 10/17/24  0402   * 204*   BUN 18 20   CREATSERUM 1.72* 1.34*   CA 9.3 8.6*   ALB 3.2* 3.4   * 128*   K 3.3* 4.1   CL 91* 99   CO2 26.0 26.0   ALKPHO 105 90   AST 29 30   ALT 32 22   BILT 1.6 1.3*   TP 8.5* 6.1       No results for input(s): \"PTP\", \"INR\" in the last 168 hours.           Imaging: Imaging data reviewed in  Epic.    Medications:    pantoprazole  20 mg Oral QAM AC    acetaminophen  1,000 mg Intravenous Q6H    piperacillin-tazobactam  3.375 g Intravenous Q8H TRACY    insulin aspart  2-10 Units Subcutaneous TID AC and HS       Allergies:  Allergies[2]    Imaging:  I have personally reviewed all pertinent available imaging.       ASSESSMENT / PLAN:     Buddy Meek is a 74 year old male with chronic medical conditions including diabetes hypertension, and BPH with dyspnea and was found to have cholecystitis with perforated gallbladder.  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  Etiology for this currently unknown.  May be secondary to pressurized injection of the contrast but need to to rule out additional ampullary and pancreatic lesions.  LFTs overall within normal limits.      Recommendations:   Okay for clear liquid diet and n.p.o. 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  GI will follow. Please page with any questions or concerns.     Jose Jama DO  10/17/2024  [facility]       [1]   Allergies  Allergen Reactions    Rocephin [Ceftriaxone] HIVES   [2]   Allergies  Allergen Reactions    Rocephin [Ceftriaxone] HIVES

## 2024-10-17 NOTE — CONSULTS
[facility]  Report of Consultation    Buddy Meek Patient Status:  Inpatient    1950 MRN [id number]   Location [facility] PRE OP HOLDING Attending Jazlyn Goff MD   Hosp Day # 1 PCP Chantelle Enamorado MD     Requesting Physician:  Dr. Luz Marina Gallagher    Reason for Consultation:  Acute cholecystitis    Chief Complaint:  Abdominal pain    History of Present Illness:  Buddy Meek is a 74 year old male who presents to [facility] on 10/16/2024 with complaints of fever and chills.  The patient states that approximately 5 days prior to his admission he began to develop abdominal bloating and distention.  He reported that the episodes of bloating were intermittent.  He stated that the episodes were associated with nausea and vomiting.  He also reported a decrease in appetite over the past several days.  He reports that on the day of his admission he began to develop a sudden onset of shortness of breath that was associated with fever, chills and shaking.  The patient stated that he presented to the emergency department due to the shaking and chills.    Upon presentation to the hospital, the patient was febrile with temperature of 102.8 °F.  He was normotensive.  WBC 1.9 hemoglobin 14.0 platelets 120,000.  Lactic acid was elevated at 8.4.  LFTs were within normal limit.  Repeat lactic was 3.2.  CTA of the chest, abdomen and pelvis was negative for pulmonary embolism.  There were findings of cholelithiasis with marked gallbladder wall thickening, mucosal hyperemia and pericholecystic inflammation most consistent with acute cholecystitis.    Past abdominal surgical history is positive for open appendectomy.      History:  Past Medical History:    BPH (benign prostatic hyperplasia)    Diabetes (HCC)    Essential hypertension     Past Surgical History:   Procedure Laterality Date    Appendectomy       History reviewed. No pertinent family history.   reports that he has never smoked. He has never used  smokeless tobacco. He reports that he does not drink alcohol and does not use drugs.    Allergies:  Allergies[1]    Medications:  Medications Prior to Admission   Medication Sig    orphenadrine  MG Oral Tablet 12 Hr Take 100 mg by mouth 2 (two) times daily as needed (stiffness or spasm).    amLODIPine 10 MG Oral Tab Take 1 tablet (10 mg total) by mouth daily.    pantoprazole 20 MG Oral Tab EC Take 1 tablet (20 mg total) by mouth every morning before breakfast.    Enalapril Maleate 20 MG Oral Tab Take 1 tablet (20 mg total) by mouth daily.    MetFORMIN HCl 1000 MG Oral Tab Take 1 tablet (1,000 mg total) by mouth 2 (two) times daily with meals.    naproxen 500 MG Oral Tab Take 1 tablet (500 mg total) by mouth 2 (two) times daily as needed.         Current Facility-Administered Medications:     [Transfer Hold] pantoprazole (Protonix) DR tab 20 mg, 20 mg, Oral, QAM AC    lactated ringers infusion, , Intravenous, Continuous    indocyanine green (IC-Green) injection 5 mg, 5 mg, Intravenous, Once    [Transfer Hold] HYDROmorphone (Dilaudid) 1 MG/ML injection 0.2 mg, 0.2 mg, Intravenous, Q4H PRN **OR** [Transfer Hold] HYDROmorphone (Dilaudid) 1 MG/ML injection 0.4 mg, 0.4 mg, Intravenous, Q4H PRN **OR** [Transfer Hold] HYDROmorphone (Dilaudid) 1 MG/ML injection 0.8 mg, 0.8 mg, Intravenous, Q4H PRN    [Transfer Hold] polyethylene glycol (PEG 3350) (Miralax) 17 g oral packet 17 g, 17 g, Oral, Daily PRN    [Transfer Hold] sennosides (Senokot) tab 17.2 mg, 17.2 mg, Oral, Nightly PRN    [Transfer Hold] bisacodyl (Dulcolax) 10 MG rectal suppository 10 mg, 10 mg, Rectal, Daily PRN    [Transfer Hold] fleet enema (Fleet) rectal enema 133 mL, 1 enema, Rectal, Once PRN    [Transfer Hold] ondansetron (Zofran) 4 MG/2ML injection 4 mg, 4 mg, Intravenous, Q6H PRN    [Transfer Hold] metoclopramide (Reglan) 5 mg/mL injection 5 mg, 5 mg, Intravenous, Q8H PRN    [Transfer Hold] glycerin-hypromellose- (Artificial Tears) 0.2-0.2-1 %  ophthalmic solution 1 drop, 1 drop, Both Eyes, QID PRN    [Transfer Hold] sodium chloride (Saline Mist) 0.65 % nasal solution 1 spray, 1 spray, Each Nare, Q3H PRN    [Transfer Hold] acetaminophen (Ofirmev) 10 mg/mL infusion premix 1,000 mg, 1,000 mg, Intravenous, Q6H PRN    piperacillin-tazobactam (Zosyn) 3.375 g in dextrose 5% 100 mL IVPB-ADDV, 3.375 g, Intravenous, Q8H TRACY    [Transfer Hold] glucose (Dex4) 15 GM/59ML oral liquid 15 g, 15 g, Oral, Q15 Min PRN **OR** [Transfer Hold] glucose (Glutose) 40% oral gel 15 g, 15 g, Oral, Q15 Min PRN **OR** [Transfer Hold] glucose-vitamin C (Dex-4) chewable tab 4 tablet, 4 tablet, Oral, Q15 Min PRN **OR** [Transfer Hold] dextrose 50% injection 50 mL, 50 mL, Intravenous, Q15 Min PRN **OR** [Transfer Hold] glucose (Dex4) 15 GM/59ML oral liquid 30 g, 30 g, Oral, Q15 Min PRN **OR** [Transfer Hold] glucose (Glutose) 40% oral gel 30 g, 30 g, Oral, Q15 Min PRN **OR** [Transfer Hold] glucose-vitamin C (Dex-4) chewable tab 8 tablet, 8 tablet, Oral, Q15 Min PRN    [Transfer Hold] insulin aspart (NovoLOG) 100 Units/mL FlexPen 2-10 Units, 2-10 Units, Subcutaneous, TID AC and HS    Review of Systems:  Review of Systems   Constitutional:  Negative for chills and diaphoresis.   HENT:  Negative for congestion, rhinorrhea and sore throat.    Eyes:  Negative for discharge and redness.   Respiratory:  Negative for chest tightness, shortness of breath and wheezing.    Cardiovascular:  Negative for chest pain and leg swelling.   Gastrointestinal:  Positive for abdominal pain, diarrhea and abdominal distention. Negative for blood in stool and anal bleeding.   Genitourinary:  Negative for flank pain and difficulty urinating.   Musculoskeletal:  Negative for myalgias and neck pain.   Skin:  Negative for rash and wound.   Neurological:  Negative for dizziness and headaches.       Physical Exam:  /71 (BP Location: Left arm)   Pulse 75   Temp 97.6 °F (36.4 °C) (Oral)   Resp 18   Ht 74\"    Wt 220 lb (99.8 kg)   SpO2 97%   BMI 28.25 kg/m²   Physical Exam  Constitutional:       General: He is not in acute distress.     Appearance: Normal appearance. He is not ill-appearing.   HENT:      Head: Normocephalic and atraumatic.   Cardiovascular:      Rate and Rhythm: Normal rate and regular rhythm.      Pulses: Normal pulses.   Pulmonary:      Effort: Pulmonary effort is normal. No respiratory distress.   Abdominal:      General: There is distension.      Palpations: Abdomen is soft.      Tenderness: There is no abdominal tenderness. There is no guarding or rebound.      Hernia: A hernia is present. Hernia is present in the umbilical area.      Comments: Abdomen is soft, mildly distended, nontender to palpation, small, reducible, nontender umbilical hernia noted.   Musculoskeletal:         General: Normal range of motion.      Cervical back: Normal range of motion.   Skin:     General: Skin is warm and dry.   Neurological:      General: No focal deficit present.      Mental Status: He is alert and oriented to person, place, and time.   Psychiatric:         Mood and Affect: Mood normal.         Behavior: Behavior normal.         Laboratory Data:  Recent Labs   Lab 10/16/24  1933 10/17/24  0402   RBC 4.67 3.65*   HGB 14.0 10.9*   HCT 40.6 30.3*   MCV 86.9 83.0   MCH 30.0 29.9   MCHC 34.5 36.0   RDW 14.6 14.3   NEPRELIM 1.44* 8.56*   WBC 1.9* 10.0   .0* 93.0*       Recent Labs   Lab 10/16/24  1933 10/17/24  0402   * 204*   BUN 18 20   CREATSERUM 1.72* 1.34*   CA 9.3 8.6*   ALB 3.2* 3.4   * 128*   K 3.3* 4.1   CL 91* 99   CO2 26.0 26.0   ALKPHO 105 90   AST 29 30   ALT 32 22   BILT 1.6 1.3*   TP 8.5* 6.1         No results for input(s): \"PTP\", \"INR\", \"PTT\" in the last 168 hours.      CTA CHEST + CT ABD (W) + CT PEL (W) (CPT=71275/19179)    Result Date: 10/16/2024  CONCLUSION:   1. Negative for pulmonary embolism or other acute cardiopulmonary process.  2. Cholelithiasis with marked  gallbladder wall thickening, mucosal hyperemia, and pericholecystic inflammation most consistent with acute cholecystitis.  Small hypoattenuating focus within the wall of the gallbladder fundus suspicious for focus of wall perforation.  No organized regional fluid collections.  3. Mild reactive inflammation of the 1st portion of the duodenum and hepatic flexure of the colon as they course near the gallbladder fossa.  Findings discussed with Dr. Brandt at 8:45 p.m. Central standard time on 10/16/2024.   LOCATION:  [facility]   Dictated by (CST): Adore Jacobs MD on 10/16/2024 at 8:28 PM     Finalized by (CST): Adore Jacobs MD on 10/16/2024 at 8:44 PM       XR CHEST AP PORTABLE  (CPT=71045)    Result Date: 10/16/2024  CONCLUSION:  Exam is within normal limits.   LOCATION:  [facility]      Dictated by (CST): Alex Yan DO on 10/16/2024 at 8:27 PM     Finalized by (CST): Alex Yan DO on 10/16/2024 at 8:27 PM          Jadyn Grajeda PA-C  10/17/2024  11:24 AM    Is this a shared or split note between Advanced Practice Provider and Physician? Yes    Medical Decision Making         Impression:  Patient Active Problem List   Diagnosis    Acute cholecystitis    Shortness of breath       74-year-old gentleman who presents to the emergency department with complaints of abdominal pain.  The pain is primarily in the epigastrium and the right upper quadrant.  The patient reports that over the past 5 days he has had progressively worsening abdominal distention and abdominal pain.  He has also experienced intermittent nausea with bilious emesis.  Evaluation in the emergency department did reveal temperature of 102.8 initial WBC 1.9 today 10.0-1.9 likely error.  Hemoglobin this morning 10.9, platelet count 93.  An elevated 1.3, LFTs within normal limits, total bilirubin mildly elevated 1.3, magnesium diminished 1.5, lactic acid mildly elevated 2.3.  Glucose elevated 204  CTA chest and CT abdomen pelvis reveals no evidence of pulmonary  embolism or other acute cardiopulmonary process.  Cholelithiasis with marked gallbladder wall thickening, mucosal hyperemia, very cholecystic inflammation is noted.  Findings are consistent with cholecystitis, cholelithiasis.  On examination, abdomen is rotund, small reducible umbilical hernia is noted.  Mild tenderness to deep palpation in the right upper quadrant.  No evidence of guarding, rebound or percussion tenderness.  Past medical, surgical history-hypertension, reflux, diabetes, open appendectomy  Treatment options reviewed in detail with Buddy as well as his 2 daughters at the bedside.  At this time the recommendation is to proceed with laparoscopic cholecystectomy with cholangiogram symptomatic cholelithiasis, acute cholecystitis.  The risks and benefits of procedure were reviewed in detail with the patient as well as his daughters at the bedside.  They have no further questions at this time and do wish to proceed with surgery today  The risks, benefits, complications, possible outcomes and alternatives of the procedure were explained to the patient in detail.  Potential complications of this procedure and as with any surgical procedure and anesthesia were reviewed including but not limited to bleeding, infection, thromboembolic event, pneumonia were discussed.  Expected postoperative pain, recuperation and postoperative course and possible complications were also reviewed.  All questions from the patient were answered in detail.  The patient did verbalize understanding and does not have any further questions at this time.  The patient does wish to proceed with the proposed procedure.    JUANPABLO Alvarado MD, FACS    Please note that this report has been produced using speech recognition software and may contain errors related to that system including but not limited to errors in grammar, punctuation and spelling as well as words and phrases that possibly may have been recognized inappropriately.  If there are  any questions or concerns please contact the dictating provider for clarification.  The 21st Century Cures Act makes medical notes like these available to patients in the interest of trans parency. Please be advised this is a medical document. Medical documents are intended to carry relevant information, facts as evident, and the clinical opinion of the practitioner. The medical note is intended as peer to peer communication and may appear blunt or direct. It is written in medical language and may contain abbreviations or verbiage that are unfamiliar.  If there are any questions or concerns please contact the dictating provider for clarification.           [1]   Allergies  Allergen Reactions    Rocephin [Ceftriaxone] HIVES

## 2024-10-17 NOTE — ANESTHESIA PREPROCEDURE EVALUATION
PRE-OP EVALUATION    Patient Name: Buddy Meek    Admit Diagnosis: Acute cholecystitis [K81.0]  Shortness of breath [R06.02]    Pre-op Diagnosis: Cholecystitis [K81.9]    LAPAROSCOPIC CHOLECYSTECTOMY WITH INDOCYANINE GREEN    Anesthesia Procedure: LAPAROSCOPIC CHOLECYSTECTOMY WITH INDOCYANINE GREEN    Surgeons and Role:     * Ginette Alvarado MD - Primary    Pre-op vitals reviewed.  Temp: 97.6 °F (36.4 °C)  Pulse: 75  Resp: 18  BP: 120/71  SpO2: 97 %  Body mass index is 28.25 kg/m².    Current medications reviewed.  Hospital Medications:   [Transfer Hold] pantoprazole (Protonix) DR tab 20 mg  20 mg Oral QAM AC    lactated ringers infusion   Intravenous Continuous    [COMPLETED] magnesium sulfate 4 g/100mL IVPB premix 4 g  4 g Intravenous Once    indocyanine green (IC-Green) injection 5 mg  5 mg Intravenous Once    [COMPLETED] ipratropium-albuterol (Duoneb) 0.5-2.5 (3) MG/3ML inhalation solution 3 mL  3 mL Nebulization Once    [COMPLETED] acetaminophen (Tylenol Extra Strength) tab 1,000 mg  1,000 mg Oral Once    [COMPLETED] ondansetron (Zofran) 4 MG/2ML injection 4 mg  4 mg Intravenous Once    [COMPLETED] iopamidol 76% (ISOVUE-370) injection for power injector  100 mL Intravenous ONCE PRN    [COMPLETED] sodium chloride 0.9% infusion   Intravenous Once    [COMPLETED] potassium chloride (Klor-Con M20) tab 20 mEq  20 mEq Oral Once    [COMPLETED] levoFLOXacin in dextrose 5% (Levaquin) 500 mg/100mL IVPB premix 500 mg  500 mg Intravenous Once    [COMPLETED] metRONIDAZOLE in sodium chloride 0.79% (Flagyl) 5 mg/mL IVPB premix 500 mg  500 mg Intravenous Once    [Transfer Hold] HYDROmorphone (Dilaudid) 1 MG/ML injection 0.2 mg  0.2 mg Intravenous Q4H PRN    Or    [Transfer Hold] HYDROmorphone (Dilaudid) 1 MG/ML injection 0.4 mg  0.4 mg Intravenous Q4H PRN    Or    [Transfer Hold] HYDROmorphone (Dilaudid) 1 MG/ML injection 0.8 mg  0.8 mg Intravenous Q4H PRN    [Transfer Hold] polyethylene glycol (PEG 3350) (Miralax) 17 g oral  packet 17 g  17 g Oral Daily PRN    [Transfer Hold] sennosides (Senokot) tab 17.2 mg  17.2 mg Oral Nightly PRN    [Transfer Hold] bisacodyl (Dulcolax) 10 MG rectal suppository 10 mg  10 mg Rectal Daily PRN    [Transfer Hold] fleet enema (Fleet) rectal enema 133 mL  1 enema Rectal Once PRN    [Transfer Hold] ondansetron (Zofran) 4 MG/2ML injection 4 mg  4 mg Intravenous Q6H PRN    [Transfer Hold] metoclopramide (Reglan) 5 mg/mL injection 5 mg  5 mg Intravenous Q8H PRN    [Transfer Hold] glycerin-hypromellose- (Artificial Tears) 0.2-0.2-1 % ophthalmic solution 1 drop  1 drop Both Eyes QID PRN    [Transfer Hold] sodium chloride (Saline Mist) 0.65 % nasal solution 1 spray  1 spray Each Nare Q3H PRN    [COMPLETED] lactated ringers IV bolus 2,994 mL  30 mL/kg Intravenous Once    [Transfer Hold] acetaminophen (Ofirmev) 10 mg/mL infusion premix 1,000 mg  1,000 mg Intravenous Q6H PRN    piperacillin-tazobactam (Zosyn) 3.375 g in dextrose 5% 100 mL IVPB-ADDV  3.375 g Intravenous Q8H TRACY    [Transfer Hold] glucose (Dex4) 15 GM/59ML oral liquid 15 g  15 g Oral Q15 Min PRN    Or    [Transfer Hold] glucose (Glutose) 40% oral gel 15 g  15 g Oral Q15 Min PRN    Or    [Transfer Hold] glucose-vitamin C (Dex-4) chewable tab 4 tablet  4 tablet Oral Q15 Min PRN    Or    [Transfer Hold] dextrose 50% injection 50 mL  50 mL Intravenous Q15 Min PRN    Or    [Transfer Hold] glucose (Dex4) 15 GM/59ML oral liquid 30 g  30 g Oral Q15 Min PRN    Or    [Transfer Hold] glucose (Glutose) 40% oral gel 30 g  30 g Oral Q15 Min PRN    Or    [Transfer Hold] glucose-vitamin C (Dex-4) chewable tab 8 tablet  8 tablet Oral Q15 Min PRN    [Transfer Hold] insulin aspart (NovoLOG) 100 Units/mL FlexPen 2-10 Units  2-10 Units Subcutaneous TID AC and HS       Outpatient Medications:   Prescriptions Prior to Admission[1]    Allergies: Rocephin [ceftriaxone]      Anesthesia Evaluation        Anesthetic Complications           GI/Hepatic/Renal  Comment: Acute  cholecystitis            (-) chronic renal disease   (-) liver disease                 Cardiovascular      ECG reviewed.      MET: >4      (+) hypertension                                     Endo/Other      (+) diabetes  type 2, not using insulin  (-) hypothyroidism  (-) hyperthyroidism                     Pulmonary      (-) asthma  (-) COPD                   Neuro/Psych          (-) CVA   (-) TIA  (-) seizures               BPH        Past Surgical History:   Procedure Laterality Date    Appendectomy       Social History     Socioeconomic History    Marital status: Single   Tobacco Use    Smoking status: Never    Smokeless tobacco: Never   Vaping Use    Vaping status: Never Used   Substance and Sexual Activity    Alcohol use: Never    Drug use: Never     History   Drug Use Unknown     Available pre-op labs reviewed.  Lab Results   Component Value Date    WBC 10.0 10/17/2024    RBC 3.65 (L) 10/17/2024    HGB 10.9 (L) 10/17/2024    HCT 30.3 (L) 10/17/2024    MCV 83.0 10/17/2024    MCH 29.9 10/17/2024    MCHC 36.0 10/17/2024    RDW 14.3 10/17/2024    PLT 93.0 (L) 10/17/2024     Lab Results   Component Value Date     (L) 10/17/2024    K 4.1 10/17/2024    CL 99 10/17/2024    CO2 26.0 10/17/2024    BUN 20 10/17/2024    CREATSERUM 1.34 (H) 10/17/2024     (H) 10/17/2024    CA 8.6 (L) 10/17/2024            Airway      Mallampati: I  Mouth opening: >3 FB    Neck ROM: full Cardiovascular    Cardiovascular exam normal.         Dental    Dentition appears grossly intact         Pulmonary    Pulmonary exam normal.                 Other findings              ASA: 3   Plan: general  NPO status verified and patient meets guidelines.  Patient has not taken beta blockers in last 24 hours.  Post-procedure pain management plan discussed with surgeon and patient.    Comment: I spoke with the patient and discussed the risks of general anesthesia, which include nausea and vomiting; sore throat; injury to the lips, gums,  teeth, and eyes; cardiac, pulmonary, and neurologic events; aspiration; and allergic reactions. The patient understands these risks and consents to receiving general anesthesia for this procedure.    Plan/risks discussed with: patient and child/children      Consented to blood products.          Present on Admission:  **None**             [1]   Medications Prior to Admission   Medication Sig Dispense Refill Last Dose/Taking    orphenadrine  MG Oral Tablet 12 Hr Take 100 mg by mouth 2 (two) times daily as needed (stiffness or spasm). 10 tablet 0 10/16/2024    amLODIPine 10 MG Oral Tab Take 1 tablet (10 mg total) by mouth daily.   10/16/2024    pantoprazole 20 MG Oral Tab EC Take 1 tablet (20 mg total) by mouth every morning before breakfast.   10/16/2024    Enalapril Maleate 20 MG Oral Tab Take 1 tablet (20 mg total) by mouth daily.   10/16/2024    MetFORMIN HCl 1000 MG Oral Tab Take 1 tablet (1,000 mg total) by mouth 2 (two) times daily with meals.   10/16/2024    naproxen 500 MG Oral Tab Take 1 tablet (500 mg total) by mouth 2 (two) times daily as needed. 20 tablet 0 Unknown

## 2024-10-17 NOTE — OPERATIVE REPORT
[facility]   Operative Note    Buddy Meek Location: OR   CSN [id number] MRN [id number]   Admission Date 10/16/2024 Operation Date 10/17/2024   Attending Physician Jazlyn Goff MD Operating Physician Ginette Alvarado MD     Date of procedure:    October 17, 2024    Pre-Operative Diagnosis: Cholecystitis, Cholelithiasis, umbilical hernia [K81.9]    Indication for Surgery: Symptomatic cholelithiasis, cholecystitis, umbilical hernia    Post-Operative Diagnosis: Same as above-empyema of the gallbladder, severe acute on chronic cholecystitis, cholelithiasis, negative intraoperative cholangiogram, stenosis of the pancreatic duct    Procedure performed:  Laparoscopic cholecystectomy with ICG cholangiogram, intraoperative cholangiogram fluoroscopy, repair umbilical hernia, placement of Surgiflo and Surgicel    Surgeons and Role:     * Ginette Alvarado MD - Primary    Assistant:   SHARATH Vu    Anesthesia: General    History of Present Illness:    74-year-old gentleman who presents to the emergency department with complaints of abdominal pain.  The pain is primarily in the epigastrium and the right upper quadrant.  The patient reports that over the past 5 days he has had progressively worsening abdominal distention and abdominal pain.  He has also experienced intermittent nausea with bilious emesis.  Evaluation in the emergency department did reveal temperature of 102.8 initial WBC 1.9 today 10.0-1.9 likely error.  Hemoglobin this morning 10.9, platelet count 93.  An elevated 1.3, LFTs within normal limits, total bilirubin mildly elevated 1.3, magnesium diminished 1.5, lactic acid mildly elevated 2.3.  Glucose elevated 204  CTA chest and CT abdomen pelvis reveals no evidence of pulmonary embolism or other acute cardiopulmonary process.  Cholelithiasis with marked gallbladder wall thickening, mucosal hyperemia, very cholecystic inflammation is noted.  Findings are consistent with cholecystitis,  cholelithiasis.  On examination, abdomen is rotund, small reducible umbilical hernia is noted.  Mild tenderness to deep palpation in the right upper quadrant.  No evidence of guarding, rebound or percussion tenderness.  Past medical, surgical history-hypertension, reflux, diabetes, open appendectomy  Treatment options reviewed in detail with Buddy as well as his 2 daughters at the bedside.  At this time the recommendation is to proceed with laparoscopic cholecystectomy with cholangiogram symptomatic cholelithiasis, acute cholecystitis.  The risks and benefits of procedure were reviewed in detail with the patient as well as his daughters at the bedside.  They have no further questions at this time and do wish to proceed with surgery today    Discussed with patient:  The potential risks and benefits of the procedure were discussed in detail with the patient including but not limited to bleeding, infection, seroma/hematoma formation, postoperative wound complications including development of a hernia, trocar injury, intra-abdominal organ injury, bile leakage, biloma formation, common bile duct injury, conversion to an open procedure, possible need for a drain, possible recurrence or persistence of symptoms despite surgery,  postoperative diarrhea, possible need for further treatment/intervention pending intra-operative/IOC findings etc, pneumonia, postoperative thromboembolic event including DVT and PE. These and other potential intra-operative and post-operative risks were reviewed with the patient and they do not have any questions and does wish to proceed with surgery today.    Note:   A surgical assistant was essential to the proper conduct of this case particularly the aspect of dissection necessary in and around the hilum of the gallbladder, identification of critical structures such as the cystic duct, common bile duct, cystic artery and cystic plate.    Summary of Procedure:   The patient was taken to the  operating room and was placed on the OR table in a supine position. After the induction of general anesthesia, perioperative antibiotics were given. The patient was then prepped and draped in usual sterile fashion. Using local anesthesia a jose a-umbilical incision was made and the anterior abdominal fascia was identified.  The umbilical hernia was then identified and the umbilicus was elevated.  Under direct vision the Veress needle was introduced.  The Veress needle was introduced into the abdomen and the abdomen was insufflated to 15 mm mercury. The Veress needle was then exchanged for a 11 mm port.  The camera was introduced to the 11 mm port.  A 5 mm epigastric port and two 5 mm lateral ports were placed under direct vision without incidence.   The patient was placed into a reverse Trendelenburg position with the right side up.   Initial evaluation of the right upper quadrant revealed the gallbladder to be completely obscured by omental adhesions.  The omental adhesions were then bluntly pushed away from the gallbladder wall.  Once the gallbladder was brought into view, the gall gallbladder itself was noted to be quite erythematous and distended.  The wall appeared thickened consistent with severe acute on chronic cholecystitis.  As the gallbladder was manipulated, a puncture cord near the fundus of the gallbladder.  Immediately a large amount of purulent material emanated from the gallbladder itself consistent with empyema.  Once the gallbladder was decompressed, it was again grasped and Hernández's pouch was identified and this was retracted laterally to expose the triangle of Calot.  ICG fluorescent imaging was then performed in an attempt to  confirm anatomy during dissection in the cystic duct-gallbladder junction.  Unfortunately, a large stone was lodged in the neck of the gallbladder and the cystic duct was not able to be illuminated with ICG.  The common bile duct however was identified.  Anatomic landmarks  were used to continue dissection  to define the cystic duct for sufficient length.  Dissection was kept above the line of Rouviere and a critical view was obtained.    As ICG fluorescence imaging was unsuccessful due to gallstone lodged in the neck of the gallbladder and cystic duct obstruction, the decision was made to proceed with contrast fluoroscopic x-ray cholangiogram.  The cystic duct was then clipped once proximally on the specimen side.  A ductotomy was made.  A cholangiocatheter was introduced through the lateral 5 mm port.   The cholangiocatheter was introduced into the cystic duct.  An intraoperative cholangiogram was then performed. There was no evidence of a filling defect in the cystic or the common bile duct. The common bile duct tapers down smoothly to the duodenum and there was free flow of contrast into the duodenum. The proximal common hepatic duct and distal intrahepatic radicals were visualized  without any evidence of a filling defects or other abnormalities. Representative images were submitted to the Radiology Department for a final read.  The cholangiocatheter was then removed.      ICG fluorescent imaging was again performed to confirm the common bile duct.  The cystic duct was quite dilated.  Therefore the decision was made to use a clip as well as an Endoloop to occlude the distal cystic duct.  A clip was placed distally across the cystic duct and the cystic duct was then divided.  Subsequently an Endoloop was placed distal to the clip to ensure complete closure of the cystic duct lumen.    The cystic artery was identified and seen  to ascend onto the gallbladder wall.  This was also defined for a sufficient length and was clipped twice proximally and once distally and divided. Electrocautery was then used to dissect the gallbladder away from the liver bed. Having completed this maneuver the gallbladder was placed into a Endopouch and was withdrawn through the umbilical port site. The  right upper quadrant was copiously irrigated with antibiotic irrigation until the irrigant was clear. The clips across the cystic duct and the cystic artery were examined and found to be intact. There was no evidence of bleeding or bile leakage from the liver bed.  However, as a precaution, due to the degree of inflammatory changes, Surgiflo was placed into the gallbladder fossa.  Additionally, Surgicel was placed over the omentum which was dissected away from the gallbladder wall as the surface was noted to be quite raw and erythematous.  The remainder of the irrigation fluid, which was clear, was aspirated.  The accessory ports were removed under direct vision and there was no evidence of bleeding from the anterior abdominal wall. The abdomen was then deflated. The umbilical hernia was repaired with 0 Vicryl.  The umbilicus was tacked down to the anterior abdominal fascia using 2-0 Vicryl suture.  All skin incisions were closed with 4-0 Vicryl in a subcuticular manner.  All sponge, instrument and needle counts were correct at the conclusion of the procedure. The patient did tolerate the procedure well.  The patient was taken to the recovery room in stable condition.  Intraoperative findings were discussed with the patient's friend Su postoperatively.  She has no further questions at this time.    Complications:  None    EBL:    Minimal    Pathologic specimens: The gallbladder and its contents    JUANPABLO Alvarado MD, FACS      Please note that this report has been produced using speech recognition software and may contain errors related to that system including but not limited to errors in grammar, punctuation and spelling as well as words and phrases that possibly may have been recognized inappropriately.  If there are any questions or concerns please contact the dictating provider for clarification.  The 21st Century Cures Act makes medical notes like these available to patients in the interest of trans parency.  Please be advised this is a medical document. Medical documents are intended to carry relevant information, facts as evident, and the clinical opinion of the practitioner. The medical note is intended as peer to peer communication and may appear blunt or direct. It is written in medical language and may contain abbreviations or verbiage that are unfamiliar.  If there are any questions or concerns please contact the dictating provider for clarification.

## 2024-10-17 NOTE — ED QUICK NOTES
Orders for admission, patient is aware of plan and ready to go upstairs. Any questions, please call ED RN Sangeeta at extension [phone].     Patient Covid vaccination status: Fully vaccinated     COVID Test Ordered in ED: Rapid SARS-CoV-2 by PCR    COVID Suspicion at Admission: N/A    Running Infusions:    NS 0.9% at 75ml/hr and Flagyl at 100ml/hr  Mental Status/LOC at time of transport: A&OX4    Other pertinent information: on 3L nc  CIWA score: N/A   NIH score:  N/A

## 2024-10-17 NOTE — ANESTHESIA PROCEDURE NOTES
Airway  Date/Time: 10/17/2024 11:39 AM  Urgency: elective    Airway not difficult    General Information and Staff    Patient location during procedure: OR  Anesthesiologist: Juan Carlos Jacobs DO  Performed: anesthesiologist   Performed by: Juan Carlos Jacobs DO  Authorized by: Juan Carlos Jacobs DO      Indications and Patient Condition  Indications for airway management: anesthesia  Spontaneous Ventilation: absent  Sedation level: deep  Preoxygenated: yes  Patient position: sniffing  MILS not maintained throughout  Mask difficulty assessment: 1 - vent by mask    Final Airway Details  Final airway type: endotracheal airway      Successful airway: ETT  Cuffed: yes   Successful intubation technique: Video laryngoscopy  Facilitating devices/methods: intubating stylet  Endotracheal tube insertion site: oral  Blade: GlideScope  Blade size: #4  ETT size (mm): 7.0    Cormack-Lehane Classification: grade I - full view of glottis  Placement verified by: capnometry   Measured from: lips  ETT to lips (cm): 22  Number of attempts at approach: 1  Ventilation between attempts: none  Number of other approaches attempted: 0

## 2024-10-17 NOTE — PROGRESS NOTES
Bolus infusing as per order. Antibiotics as ordered.Pt still denies pain. VSS, afebrile but diaphoretic. Awaiting pt to void to send sample. Poc discussed, call light in reach.

## 2024-10-17 NOTE — ED PROVIDER NOTES
Patient Seen in: [facility] In [place]      History     Chief Complaint   Patient presents with    Difficulty Breathing     Stated Complaint: tj    Subjective:   HPI      Patient reports labored breathing starting about 30 minutes prior to arrival.  Patient denies any chest pain or pressure.  No painful breathing.  He feels very short of breath.  Patient feels like his abdomen is bloated.  He was nauseous and had an episode of emesis without coffee-ground or bloody emesis here in the emergency department.  No calf pain or swelling.  No edema.    Objective:     Past Medical History:    BPH (benign prostatic hyperplasia)    Diabetes (HCC)    Essential hypertension              Past Surgical History:   Procedure Laterality Date    Appendectomy                  Social History     Socioeconomic History    Marital status: Single   Tobacco Use    Smoking status: Never    Smokeless tobacco: Never   Vaping Use    Vaping status: Never Used   Substance and Sexual Activity    Alcohol use: Never    Drug use: Never                  Physical Exam     ED Triage Vitals [10/16/24 1923]   BP (!) 135/93   Pulse (!) 128   Resp (!) 32   Temp (!) 102.8 °F (39.3 °C)   Temp src Temporal   SpO2 92 %   O2 Device None (Room air)       Current Vitals:   Vital Signs  BP: 117/57  Pulse: 110  Resp: 20  Temp: 99.5 °F (37.5 °C)  Temp src: Oral    Oxygen Therapy  SpO2: 97 %  O2 Device: None (Room air)  O2 Flow Rate (L/min): 3 L/min        Physical Exam  General: The patient is awake, alert, conversant.   Eyes: sclera white, conjunctiva pink and moist.  Lids and lashes are normal.  Neck: Difficult to appreciate JVD second and patient's body habitus  Lungs: Diminished breath sounds bilaterally prolonged expiration  Heart: Normal S1 and S2, without murmur.  Distal pulses are strong and symmetric.  Abdomen: Soft, protuberant but nondistended.  Nontender  Extremities: Unremarkable.  Calves nonswollen, symmetric, nontender.  No pedal  edema.  Skin: Somewhat pale  Neurologic:  Mental status as above.  Patient moves all extremities with good strength and coordination.        ED Course     Labs Reviewed   COMP METABOLIC PANEL (14) - Abnormal; Notable for the following components:       Result Value    Glucose 141 (*)     Sodium 127 (*)     Potassium 3.3 (*)     Chloride 91 (*)     Creatinine 1.72 (*)     Calculated Osmolality 268 (*)     eGFR-Cr 41 (*)     Total Protein 8.5 (*)     Albumin 3.2 (*)     Globulin  5.3 (*)     A/G Ratio 0.6 (*)     All other components within normal limits   CBC WITH DIFFERENTIAL WITH PLATELET - Abnormal; Notable for the following components:    WBC 1.9 (*)     .0 (*)     Immature Platelet Fraction 7.5 (*)     Neutrophil Absolute Prelim 1.44 (*)     Neutrophil Absolute 1.44 (*)     Lymphocyte Absolute 0.43 (*)     Monocyte Absolute 0.04 (*)     All other components within normal limits   PRO BETA NATRIURETIC PEPTIDE - Abnormal; Notable for the following components:    Pro-Beta Natriuretic Peptide 495 (*)     All other components within normal limits   D-DIMER - Abnormal; Notable for the following components:    D-Dimer 5.74 (*)     All other components within normal limits   LACTIC ACID, PLASMA - Abnormal; Notable for the following components:    Lactic Acid 8.4 (*)     All other components within normal limits   TROPONIN I HIGH SENSITIVITY - Normal   POCT FLU TEST - Normal    Narrative:     This assay is a rapid molecular in vitro test utilizing nucleic acid amplification of influenza A and B viral RNA.   RAPID SARS-COV-2 BY PCR - Normal   SODIUM, URINE, RANDOM   OSMOLALITY, URINE   LACTIC ACID REFLEX POST POSTIVE   RAINBOW DRAW LAVENDER   RAINBOW DRAW LIGHT GREEN   RAINBOW DRAW BLUE   BLOOD CULTURE   BLOOD CULTURE            An EKG was performed. I agree with computerized EKG interval interpretations. EKG shows sinus tachycardia with left anterior fascicular block and some irregular baseline limiting  interpretation.. There are no acute ST changes to suggest acute ischemia or infarct  Ventricular rate 115 bpm. AK interval 194 ms. QRS duration 98 ms.         MDM      Patient presents febrile, tachypneic, and tachycardic.  Patient denies any preceding cold symptoms or cough.  Pneumonia, COVID and influenza all included in differential.  With the sudden onset of tachycardia, pulmonary embolism must be excluded.  Patient without chest pain and acute coronary syndrome seems less likely with a nondiagnostic EKG    Patient treated with Tylenol.  He was given DuoNeb   Blood culture and lactic acid sent.     CBC shows white count 1.9 with significant thrombocytopenia as well.  Hemoglobin normal  Metabolic panel shows significant hyponatremia and hypokalemia.  Patient treated with normal saline at 83ml/hr  Creatinine elevated 1.72  Lactic acid elevated 8.4  Troponin negative    D-dimer significantly elevated 5.74  Flu swab negative  COVID test negative  Urinalysis pending    CTA chest with abdomen pelvis  CONCLUSION:    1. Negative for pulmonary embolism or other acute cardiopulmonary process.   2. Cholelithiasis with marked gallbladder wall thickening, mucosal hyperemia, and pericholecystic inflammation most consistent with acute cholecystitis.  Small hypoattenuating focus within the wall of the gallbladder fundus suspicious for focus of wall   perforation.  No organized regional fluid collections.   3. Mild reactive inflammation of the 1st portion of the duodenum and hepatic flexure of the colon as they course near the gallbladder fossa.     Patient did not receive 30ml/kg of fluids despite having: elevated Lactate. The reason for doing so is: patient's blood pressure responded to a lesser volume and because patient is significantly hyponatremic bolus of normal saline could be potentially dangerous.75ml/hr of fluids were given instead     On repeat examination, patient reports that his breathing is much improved.   He also denies any extraordinary abdominal pain.  On reexamination, there is minimal tenderness diffusely definitely without involuntary guarding, rigidity, or rebound.    I discussed case with general surgery, Dr. Ahmadi.  He will review the images and evaluate patient and make further recommendations.    I discussed case with hospitalist, Dr. Horne.  He will admit       [facility] IN [place]      Sepsis Reassessment Note    /57   Pulse 110   Temp 99.5 °F (37.5 °C) (Oral)   Resp 20   Ht 188 cm (6' 2\")   Wt 99.8 kg   SpO2 97%   BMI 28.25 kg/m²      I completed the sepsis reassessment at 8:50 PM    Cardiac:  Regularity: Regular  Rate: Normal  Heart Sounds: S1,S2    Lungs:   Right: Clear  Left: Clear    Peripheral Pulses:  Radial: Right 1+ or Left 1+      Capillary Refill:  <3 Secs    Skin:  Temp/Moisture: Warm and Dry  Color: Normal      Anthony Brandt MD  10/16/2024  8:59 PM           Admission disposition: 10/16/2024  9:05 PM           Medical Decision Making      Disposition and Plan     Clinical Impression:  1. Acute cholecystitis    2. Shortness of breath         Disposition:  Admit  10/16/2024  9:05 pm    Follow-up:  No follow-up provider specified.        Medications Prescribed:  Current Discharge Medication List              Supplementary Documentation:         Hospital Problems       Present on Admission             ICD-10-CM Noted POA    * (Principal) Acute cholecystitis K81.0 10/16/2024 Unknown

## 2024-10-18 ENCOUNTER — APPOINTMENT (OUTPATIENT)
Dept: MRI IMAGING | Facility: HOSPITAL | Age: 74
End: 2024-10-18
Attending: STUDENT IN AN ORGANIZED HEALTH CARE EDUCATION/TRAINING PROGRAM
Payer: MEDICARE

## 2024-10-18 LAB
ALBUMIN SERPL-MCNC: 3.5 G/DL (ref 3.2–4.8)
ALBUMIN/GLOB SERPL: 1.2 {RATIO} (ref 1–2)
ALP LIVER SERPL-CCNC: 74 U/L
ALT SERPL-CCNC: 31 U/L
ANION GAP SERPL CALC-SCNC: 6 MMOL/L (ref 0–18)
AST SERPL-CCNC: 47 U/L (ref ?–34)
BILIRUB SERPL-MCNC: 0.9 MG/DL (ref 0.2–1.1)
BUN BLD-MCNC: 16 MG/DL (ref 9–23)
CALCIUM BLD-MCNC: 8.7 MG/DL (ref 8.7–10.4)
CHLORIDE SERPL-SCNC: 100 MMOL/L (ref 98–112)
CO2 SERPL-SCNC: 25 MMOL/L (ref 21–32)
CREAT BLD-MCNC: 1.25 MG/DL
DEPRECATED HBV CORE AB SER IA-ACNC: 138 NG/ML
EGFRCR SERPLBLD CKD-EPI 2021: 61 ML/MIN/1.73M2 (ref 60–?)
ERYTHROCYTE [DISTWIDTH] IN BLOOD BY AUTOMATED COUNT: 14.9 %
FOLATE SERPL-MCNC: 10.6 NG/ML (ref 5.4–?)
GLOBULIN PLAS-MCNC: 3 G/DL (ref 2–3.5)
GLUCOSE BLD-MCNC: 144 MG/DL (ref 70–99)
GLUCOSE BLD-MCNC: 156 MG/DL (ref 70–99)
GLUCOSE BLD-MCNC: 169 MG/DL (ref 70–99)
GLUCOSE BLD-MCNC: 172 MG/DL (ref 70–99)
GLUCOSE BLD-MCNC: 176 MG/DL (ref 70–99)
HCT VFR BLD AUTO: 31.4 %
HCT VFR BLD AUTO: 34 %
HGB BLD-MCNC: 11.1 G/DL
HGB BLD-MCNC: 11.9 G/DL
IRON SATN MFR SERPL: 5 %
IRON SERPL-MCNC: 10 UG/DL
MAGNESIUM SERPL-MCNC: 2.1 MG/DL (ref 1.6–2.6)
MCH RBC QN AUTO: 29.5 PG (ref 26–34)
MCHC RBC AUTO-ENTMCNC: 35 G/DL (ref 31–37)
MCV RBC AUTO: 84.4 FL
OSMOLALITY SERPL CALC.SUM OF ELEC: 277 MOSM/KG (ref 275–295)
PLATELET # BLD AUTO: 108 10(3)UL (ref 150–450)
PLATELETS.RETICULATED NFR BLD AUTO: 7.8 % (ref 0–7)
POTASSIUM SERPL-SCNC: 3.9 MMOL/L (ref 3.5–5.1)
PROT SERPL-MCNC: 6.5 G/DL (ref 5.7–8.2)
RBC # BLD AUTO: 4.03 X10(6)UL
SODIUM SERPL-SCNC: 131 MMOL/L (ref 136–145)
TOTAL IRON BINDING CAPACITY: 222 UG/DL (ref 250–425)
TRANSFERRIN SERPL-MCNC: 148 MG/DL (ref 215–365)
VIT B12 SERPL-MCNC: >2000 PG/ML (ref 211–911)
WBC # BLD AUTO: 6.7 X10(3) UL (ref 4–11)

## 2024-10-18 PROCEDURE — 74183 MRI ABD W/O CNTR FLWD CNTR: CPT | Performed by: STUDENT IN AN ORGANIZED HEALTH CARE EDUCATION/TRAINING PROGRAM

## 2024-10-18 PROCEDURE — 99233 SBSQ HOSP IP/OBS HIGH 50: CPT | Performed by: INTERNAL MEDICINE

## 2024-10-18 PROCEDURE — 76376 3D RENDER W/INTRP POSTPROCES: CPT | Performed by: STUDENT IN AN ORGANIZED HEALTH CARE EDUCATION/TRAINING PROGRAM

## 2024-10-18 RX ORDER — HEPARIN SODIUM 5000 [USP'U]/ML
5000 INJECTION, SOLUTION INTRAVENOUS; SUBCUTANEOUS EVERY 8 HOURS SCHEDULED
Status: DISCONTINUED | OUTPATIENT
Start: 2024-10-18 | End: 2024-10-19

## 2024-10-18 RX ORDER — GADOTERATE MEGLUMINE 376.9 MG/ML
20 INJECTION INTRAVENOUS
Status: COMPLETED | OUTPATIENT
Start: 2024-10-18 | End: 2024-10-18

## 2024-10-18 NOTE — PROGRESS NOTES
[facility]   part of [facility]     Hospitalist Progress Note     Buddy Meek Patient Status:  Inpatient    1950 MRN [id number]   Location [facility] 3NW-A Attending Jazlyn Goff MD   Hosp Day # 2 PCP Chantelle Enamorado MD     Chief Complaint: Dyspnea    Subjective:     Afebrile. POD 1 S/p cholecystectomy. GI consulted for + IOC. MRI pending. Per nursing patient had bloody BM today AM. Patient wants to know if he can eat. Denies chest pain, nausea, vomiting, SOB.      Objective:    Review of Systems:   A comprehensive review of systems was completed; pertinent positive and negatives stated in subjective.    Vital signs:  Temp:  [97.4 °F (36.3 °C)-98.7 °F (37.1 °C)] 98.7 °F (37.1 °C)  Pulse:  [70-91] 77  Resp:  [12-18] 12  BP: (103-126)/(60-74) 124/71  SpO2:  [92 %-97 %] 92 %    Physical Exam:    General: No acute distress  Respiratory: No wheezes, no rhonchi  Cardiovascular: S1, S2, regular rate and rhythm  Abdomen: Soft, distended, some incisional tenderness, positive bowel sounds  Neuro: No new focal deficits.   Extremities: No edema    Diagnostic Data:    Labs:  Recent Labs   Lab 10/16/24  1933 10/17/24  0402 10/18/24  0603   WBC 1.9* 10.0 6.7   HGB 14.0 10.9* 11.9*   MCV 86.9 83.0 84.4   .0* 93.0* 108.0*       Recent Labs   Lab 10/16/24  1933 10/17/24  0402 10/18/24  0603   * 204* 169*   BUN 18 20 16   CREATSERUM 1.72* 1.34* 1.25   CA 9.3 8.6* 8.7   ALB 3.2* 3.4 3.5   * 128* 131*   K 3.3* 4.1 3.9   CL 91* 99 100   CO2 26.0 26.0 25.0   ALKPHO 105 90 74   AST 29 30 47*   ALT 32 22 31   BILT 1.6 1.3* 0.9   TP 8.5* 6.1 6.5       Estimated Creatinine Clearance: 61.2 mL/min (based on SCr of 1.25 mg/dL).    Recent Labs   Lab 10/16/24  1933   TROPHS 11       No results for input(s): \"PTP\", \"INR\" in the last 168 hours.               Microbiology    Hospital Encounter on 10/16/24   1. Blood Culture     Status: Abnormal (Preliminary result)    Collection Time: 10/16/24  7:33 PM     Specimen: Blood,peripheral   Result Value Ref Range    Blood Culture Result Positive N/A    Blood Culture Result Escherichia coli (A) N/A    Blood Smear Gram Negative Rods (A) N/A         Imaging: Reviewed in Epic.    Medications:    heparin  5,000 Units Subcutaneous Q8H TRACY    ampicillin-sulbactam  3 g Intravenous Q6H    pantoprazole  20 mg Oral QAM AC    acetaminophen  1,000 mg Intravenous Q6H    insulin aspart  2-10 Units Subcutaneous TID AC and HS       Assessment & Plan:      #Severe sepsis   #Ecoli bacteremia   #Empyema of gallbladder, severe acute on chronic cholecystitis, cholelithiasis   #lactic acidosis  #Leukopenia - resolved  Presented with dyspnea. CTA chest A/P showing acute cholecystitis, small hypoattenuating focus within wall of gallbladder fundus concerning for focis of wall peforation, mild reactive duodenitis.  -Repeat Bcx 10/18 pending  -Zosyn -> unasyn  -Follow cultures to final  -General surgery following  -Supportive care    #IOC c/f pancreatic duct dilatation with possible narrowing near the ampulla  -MRI/MRCP pending  -Outpatient EUS/ERCP   -GI following    #Hematochezia   Hemorrhoidal? Diverticular?  -GI following   -Monitor  -Repeat Hgb at 1600      #Normocytic anemia  #Component of iron deficiency   -Trend  -Oral iron at discharge     #Hyponatremia  #hypokalemia  -Trend     #hypoalbuminemia     #thrombocytopenia  2/2 sepsis. Trend.      #CKD 3a  -Cr at baseline     #DM  -SSI, Q6h checks, hypoglycemia protocol  -hold home metformin     #HTN  -hold home amlodipine and enalapril for now given initial borderline low BP and restart as able     #GERD  -cont home PPI    Luz Marina Bullock MD    Supplementary Documentation:     Quality:  DVT Mechanical Prophylaxis:   SCDs,    DVT Pharmacologic Prophylaxis   Medication    heparin (Porcine) 5000 UNIT/ML injection 5,000 Units                Code Status: Not on file  Yuan: No urinary catheter in place  Yuan Duration (in days):   Central line:     LIANE:     Discharge is dependent on: course  At this point Mr. Meek is expected to be discharge to: home    The 21st Century Cures Act makes medical notes like these available to patients in the interest of transparency. Please be advised this is a medical document. Medical documents are intended to carry relevant information, facts as evident, and the clinical opinion of the practitioner. The medical note is intended as peer to peer communication and may appear blunt or direct. It is written in medical language and may contain abbreviations or verbiage that are unfamiliar.

## 2024-10-18 NOTE — PROGRESS NOTES
Assumed care of patient at 0700. MRCP on shift. No signs of bloody BM- GI sign off at this time and call back if suspect signs of GI bleeding. Abd is rounded and firm with active bowels. Surgical sites intact. Minimal pain managed with scheduled tylenol. Advanced diet to soft/low fiber on shift and tolerating well. Iv fluids stopped. Continues on iv abx and monitoring for negative blood cultures. Updated on plan of care and condition update. All needs met on shift.

## 2024-10-18 NOTE — PLAN OF CARE
Problem: Patient/Family Goals  Goal: Patient/Family Long Term Goal  Description: Patient's Long Term Goal: Discharge in stable condition    Interventions:  - Treat infection  -Antibiotic  -Vitals  -Sx following  - See additional Care Plan goals for specific interventions  Outcome: Progressing  Goal: Patient/Family Short Term Goal  Description: Patient's Short Term Goal: Stabilize pt    Interventions:   - Keep reassessing  -Call light in reach  - See additional Care Plan goals for specific interventions  Outcome: Progressing     Problem: PAIN - ADULT  Goal: Verbalizes/displays adequate comfort level or patient's stated pain goal  Description: INTERVENTIONS:  - Encourage pt to monitor pain and request assistance  - Assess pain using appropriate pain scale  - Administer analgesics based on type and severity of pain and evaluate response  - Implement non-pharmacological measures as appropriate and evaluate response  - Consider cultural and social influences on pain and pain management  - Manage/alleviate anxiety  - Utilize distraction and/or relaxation techniques  - Monitor for opioid side effects  - Notify MD/LIP if interventions unsuccessful or patient reports new pain  - Anticipate increased pain with activity and pre-medicate as appropriate  Outcome: Progressing     Problem: SAFETY ADULT - FALL  Goal: Free from fall injury  Description: INTERVENTIONS:  - Assess pt frequently for physical needs  - Identify cognitive and physical deficits and behaviors that affect risk of falls.  - [place] fall precautions as indicated by assessment.  - Educate pt/family on patient safety including physical limitations  - Instruct pt to call for assistance with activity based on assessment  - Modify environment to reduce risk of injury  - Provide assistive devices as appropriate  - Consider OT/PT consult to assist with strengthening/mobility  - Encourage toileting schedule  Outcome: Progressing  The patient is alert and oriented x  4, and his vital signs are stable after his Laparoscopic Cholecystectomy today. Intravenous Dilaudid administered for his pain per MD order. He is tolerating clear liquids with no complaint of pain. Lap sites x 3 with steri strips and bandaids are clean, dry, and intact.

## 2024-10-18 NOTE — PROGRESS NOTES
[facility]                       Gastroenterology Follow up Note - [facility]    Buddy Meek Patient Status:  Inpatient    1950 MRN [id number]   Location [facility] 3NW-A Attending Jazlyn Goff MD   Hosp Day # 2 PCP Chantelle Enamorado MD     Reason for consultation: Abnormal IOC  Subjective: Patient seen and examined.  He had an episode earlier today where he passed some flatus and had bright red blood.  Has not had any further bleeding since.  He denies abdominal pain, nausea, emesis.  MRI was performed this morning showing no obvious mass or stricture of the bile duct.  Review of Systems:   10 point ROS completed and was negative, except for pertinent positive and negatives stated in subjective.    For PMH, PSH, FHx and SHx- please see initial consult note.     Objective: /62 (BP Location: Right arm)   Pulse 74   Temp 98.1 °F (36.7 °C) (Oral)   Resp 12   Ht 6' 2\" (1.88 m)   Wt 220 lb (99.8 kg)   SpO2 92%   BMI 28.25 kg/m²   Gen: No acute distress.  Family present in room  Resp: no respiratory distress  Abd: Soft, non-tender, non-distended. No rebound tenderness, no guarding.   Neuro: Aox3.     Labs:   Lab Results   Component Value Date    WBC 6.7 10/18/2024    HGB 11.9 10/18/2024    HCT 34.0 10/18/2024    .0 10/18/2024    CREATSERUM 1.25 10/18/2024    BUN 16 10/18/2024     10/18/2024    K 3.9 10/18/2024     10/18/2024    CO2 25.0 10/18/2024     10/18/2024    CA 8.7 10/18/2024    ALB 3.5 10/18/2024    ALKPHO 74 10/18/2024    BILT 0.9 10/18/2024    AST 47 10/18/2024    ALT 31 10/18/2024    MG 2.1 10/18/2024     Recent Labs   Lab 10/16/24  1933 10/17/24  0402 10/18/24  0603   * 204* 169*   BUN 18 20 16   CREATSERUM 1.72* 1.34* 1.25   CA 9.3 8.6* 8.7   * 128* 131*   K 3.3* 4.1 3.9   CL 91* 99 100   CO2 26.0 26.0 25.0     Recent Labs   Lab 10/17/24  0402 10/18/24  0603   RBC 3.65* 4.03   HGB 10.9* 11.9*   HCT 30.3* 34.0*    MCV 83.0 84.4   MCH 29.9 29.5   MCHC 36.0 35.0   RDW 14.3 14.9   NEPRELIM 8.56*  --    WBC 10.0 6.7   PLT 93.0* 108.0*       Recent Labs   Lab 10/16/24  1933 10/17/24  0402 10/18/24  0603   ALT 32 22 31   AST 29 30 47*       Assessment:  Buddy Meek is a 74 year old male with chronic medical conditions including diabetes hypertension, and BPH with dyspnea and was found to have cholecystitis with perforated gallbladder.  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  Etiology for this currently unknown.  May have been secondary to pressurized injection of the contrast but need to to rule out additional ampullary and pancreatic lesions.  LFTs within normal limits..  MRI of abdomen pelvis today without evidence of biliary ductal dilatation or choledocholithiasis.  No obvious stricture noted.  It did show a complex fluid collection in the gallbladder fossa that is likely postoperative seroma.    Patient also had an episode of rectal bleeding this morning.  Suspect this may be secondary to hemorrhoids but will likely need further outpatient workup to rule out polyps, masses, IBD.  No further episodes and hemoglobin stable.        Recommendations:   Diet per surgery  Would recommend continued outpatient evaluation with endoscopic ultrasound and/or ERCP  Monitor for any signs of overt bleeding  Patient will likely need outpatient colonoscopy for further evaluation of his rectal bleeding  Monitor CBC and CMP in the morning  General Surgery following and appreciate their recommendations  At this time, given normal LFTs, no further bleeding, and stable hemoglobin GI service will sign off.  Please call us back if he has any further signs of overt bleeding or hemoglobin drops.  Once discharged he will need to follow-up with nurse practitioner or myself in 3 to 4 weeks.  Please call with any questions or concerns.    Thank you for allowing us to participate in patient's  care. Please call us with any questions or concerns.     Jose Jama DO  [facility]  [phone]

## 2024-10-18 NOTE — PROGRESS NOTES
[facility]  Progress Note    Buddy Meek Patient Status:  Inpatient    1950 MRN [id number]   Location [facility] 3NW-A Attending Jazlyn Goff MD   Hosp Day # 2 PCP Chantelle Enamorado MD     Subjective:  The patient is resting in bed with family at bedside. The patient reports bright red blood per rectum after passing flatus this morning. The patient states he has a history of hemorrhoids and has noticed some bleeding with bowel movements prior to this morning's episode.    He reports abdominal soreness. He denies nausea or vomiting. He is hungry for food.    He underwent MRCP this morning. Results pending.     Hemoglobin stable.    Objective/Physical Exam:  General: Alert, orientated x3.  Cooperative.  No apparent distress.  Vital Signs:  Blood pressure 126/62, pulse 74, temperature 98.1 °F (36.7 °C), temperature source Oral, resp. rate 12, height 74\", weight 220 lb (99.8 kg), SpO2 92%.  HEENT: Normocephalic, atraumatic. No scleral icterus.  Abdomen:  Soft, mildly distended, non-tender, with no rebound or guarding.  No peritoneal signs.  Incision: Dry, clean, and intact with steri strips and bandaids in place. No surrounding erythema or drainage. No signs of infection.    Labs:  CBC:    Lab Results   Component Value Date    WBC 6.7 10/18/2024    WBC 10.0 10/17/2024    WBC 1.9 (L) 10/16/2024     Lab Results   Component Value Date    HGB 11.9 (L) 10/18/2024    HGB 10.9 (L) 10/17/2024    HGB 14.0 10/16/2024      Lab Results   Component Value Date    .0 (L) 10/18/2024    PLT 93.0 (L) 10/17/2024    .0 (L) 10/16/2024     Lab Results   Component Value Date    WBC 6.7 10/18/2024    HGB 11.9 10/18/2024    HCT 34.0 10/18/2024    .0 10/18/2024    CREATSERUM 1.25 10/18/2024    BUN 16 10/18/2024     10/18/2024    K 3.9 10/18/2024     10/18/2024    CO2 25.0 10/18/2024     10/18/2024    CA 8.7 10/18/2024    ALB 3.5 10/18/2024    ALKPHO 74 10/18/2024    BILT 0.9  10/18/2024    TP 6.5 10/18/2024    AST 47 10/18/2024    ALT 31 10/18/2024    MG 2.1 10/18/2024    B12 >2,000 10/18/2024         Assessment/Plan:  Patient Active Problem List   Diagnosis    Acute cholecystitis    Shortness of breath    Calculus of gallbladder with acute cholecystitis without obstruction    Diabetes mellitus type 2 in nonobese (HCC)     POD 1 laparoscopic cholecystectomy   Cholangiogram with stenosis of CBD and pancreatic ductal dilation    Continue npo until MRCP results are available. Okay for clear liquid diet and ADAT if MRI negative and okay with GI.  Continue antibiotics. Repeat blood cultures pending. Initial blood cultures positive for E. Coli.   Repeat CBC and LFTs tomorrow.   Continue pain control and antiemetics as needed.  Encourage ambulation and up to chair.  Encourage incentive spirometer.  DVT prophylaxis with heparin and SCDs.  GI prophylaxis with protonix    SHARATH Vu  10/18/2024  12:02 PM     This note was initiated by Mary Lou Syed PA-C.  The PA saw the patient in conjunction with me. The PA performed a history, exam, and developed the assessment and plan. I agree with the mentioned assessment and plan and have provided further documentation of my own, if necessary.    Patient had episodes of rectal bleeding overnight and early this morning.  Otherwise, pain is controlled and patient is repeatedly asking to eat.  Patient has been afebrile and hemodynamically normal.  His hemoglobin has been stable.  CBC and CMP within normal limits.    Patient's abdomen is soft, distended and appropriately tender to palpation.  He has dressings over his laparoscopic incisions which are clean and dry.  Patient was also examined in the left lateral decubitus position.  There are external hemorrhoids without any active rectal bleeding.    MRCP results reviewed after completing rounds.  MRCP shows a fluid collection in the gallbladder fossa.  This is likely representative of hemostatic  agents placed during surgery.  No abnormalities appreciated within the common bile duct or pancreas.    Patient is okay to have clear liquid diet and advance as tolerated if okay with GI in the setting of a possible GI bleed.  Will continue to monitor for ongoing bleeding, monitor hemodynamics and trend hemoglobin.  Continue PPI.  Continue multimodal pain control, no NSAIDs.  Encourage out of bed and ambulation.  Resume DVT prophylaxis if patient has no further rectal bleeding.  Dispo pending resolution of rectal bleeding and patient tolerating solid diet.    Guanakito Cortés MD  [facility]

## 2024-10-18 NOTE — PROGRESS NOTES
Pt is alert and oriented,on room air without signs of sob,vss  LS are clear upon assessment  Abdominal pain,surgical, controlled with prn and scheduled pain meds  Pt denies nausea  Distended abdomen, hypoactive, denies passing gas  3 lap sites with steri strips and band-aids, CDI.  NPO from MN, plan for MRCP. IVF infusing.  Poc discussed,safety precautions in place,call light in reach    0641:Bloody BM this morning, no visible form stool but just blood

## 2024-10-18 NOTE — PROGRESS NOTES
[facility]  Progress Note    Buddy Meek Patient Status:  Inpatient    1950 MRN [id number]   Location [facility] 3NW-A Attending Jazlyn Goff MD   Hosp Day # 2 PCP Chantelle Enamorado MD     Subjective:  The patient is laying in bed with his wife at bedside. Patient reports feeling good this morning on POD #1, and states he slept well overnight and is currently hungry. Patient is NPO pending abdominal MRI and MRCP today. He reports his abdominal pain is currently mild, rating it a 6/10, and says his pain has been controlled since surgery with tylenol PRN and one dose of scheduled dilaudid. He states the pain in confined to RUQ, describing it as a dull achy constant pain, and says it is slightly worsened with inhale and exhaling. He says incision sites are mildly tender to the touch. Patient denies nausea, vomiting, fevers, chills, urinary hesitancy and frequency, SOB, chest pain, extremity pain, and warmth around incision sites. He endorses several episode of flatus since surgery, and states he had one bowel movement with bright red blood that was not painful this morning.       Objective/Physical Exam:  General: Alert, orientated x3.  Cooperative.  No apparent distress.  Vital Signs:  Blood pressure 124/71, pulse 77, temperature 98.7 °F (37.1 °C), temperature source Oral, resp. rate 12, height 74\", weight 220 lb, SpO2 92%.  Wt Readings from Last 3 Encounters:   10/16/24 220 lb   24 220 lb   10/06/23 220 lb     General: A&Ox3. no acute distress. speaking in full sentences. awake, alert and easily aroused  Lungs: No respiratory distress.  Cardiac: Regular rate and rhythm.   Abdomen:  Firm, distended, tender with palpation to RUQ and RLQ, with guarding in RUQ and RLQ.  No peritoneal signs.   Extremities:  No lower extremity edema noted.    Incision: 4 laparoscopic incision sites, dressed with steri strips and band-aids. Minimal scabbing at incision sites. No visible oozing or bleeding.  Incision sites are clean, dry, intact, no erythema or warmth.    Intake/Output:    Intake/Output Summary (Last 24 hours) at 10/18/2024 0726  Last data filed at 10/18/2024 0606  Gross per 24 hour   Intake 2510 ml   Output 1065 ml   Net 1445 ml     I/O last 3 completed shifts:  In: 5986 [P.O.:630; I.V.:5356]  Out: 1865 [Urine:1825; Blood:40]  No intake/output data recorded.    Medications:    heparin  5,000 Units Subcutaneous Q8H TRACY    ampicillin-sulbactam  3 g Intravenous Q6H    pantoprazole  20 mg Oral QAM AC    acetaminophen  1,000 mg Intravenous Q6H    insulin aspart  2-10 Units Subcutaneous TID AC and HS       Labs:  Lab Results   Component Value Date    WBC 6.7 10/18/2024    HGB 11.9 10/18/2024    HCT 34.0 10/18/2024    .0 10/18/2024     Lab Results   Component Value Date     10/18/2024    K 3.9 10/18/2024     10/18/2024    CO2 25.0 10/18/2024    BUN 16 10/18/2024    CREATSERUM 1.25 10/18/2024     10/18/2024    CA 8.7 10/18/2024    ALKPHO 74 10/18/2024    ALT 31 10/18/2024    AST 47 10/18/2024    BILT 0.9 10/18/2024    ALB 3.5 10/18/2024    TP 6.5 10/18/2024     No results found for: \"PT\", \"INR\"    Blood culture results: Positive for Escherichia coli (abnormal result)    Assessment  Patient Active Problem List   Diagnosis    Acute cholecystitis    Shortness of breath    Calculus of gallbladder with acute cholecystitis without obstruction    Diabetes mellitus type 2 in nonobese (HCC)     Calculus of gallbladder with acute cholecystitis without obstruction, umbilical hernia  S/p Laparoscopic cholecystectomy with ICG cholangiogram, intraoperative cholangiogram fluoroscopy, repair umbilical hernia  E. Coli bacteremia      Plan:  Continue with NPO diet pending MRI abdomen with and without contrast and MRCP today.   Ambulate and up to chair and around unit hallways  Continue analgesics and antiemetics PRN  DVT prophylaxis with heparin and SCDs  GI prophylaxis with 20mg oral pantoprazole once  daily  Continue antibiotics as per hospitalist for bacteremia.    Patient provided verbal understanding and agreement with care plan. All patient questions answered.      Quality:  DVT Mechanical Prophylaxis:   SCDs,    DVT Pharmacologic Prophylaxis   Medication    heparin (Porcine) 5000 UNIT/ML injection 5,000 Units                Code Status: Not on file  Yuan: No urinary catheter in place          MATTHEW Gordillo  10/18/2024  7:26 AM      The patient was discussed with Jadyn Grajeda PA-C

## 2024-10-19 VITALS
TEMPERATURE: 98 F | DIASTOLIC BLOOD PRESSURE: 80 MMHG | HEART RATE: 77 BPM | RESPIRATION RATE: 18 BRPM | WEIGHT: 220 LBS | BODY MASS INDEX: 28.23 KG/M2 | HEIGHT: 74 IN | SYSTOLIC BLOOD PRESSURE: 145 MMHG | OXYGEN SATURATION: 95 %

## 2024-10-19 LAB
ALBUMIN SERPL-MCNC: 3.4 G/DL (ref 3.2–4.8)
ALBUMIN/GLOB SERPL: 1.2 {RATIO} (ref 1–2)
ALP LIVER SERPL-CCNC: 92 U/L
ALT SERPL-CCNC: 29 U/L
ANION GAP SERPL CALC-SCNC: 6 MMOL/L (ref 0–18)
AST SERPL-CCNC: 43 U/L (ref ?–34)
BACTERIA BLD CULT: POSITIVE
BACTERIA BLD CULT: POSITIVE
BILIRUB SERPL-MCNC: 0.6 MG/DL (ref 0.2–1.1)
BLACTX-M ISLT/SPM QL: NOT DETECTED
BUN BLD-MCNC: 11 MG/DL (ref 9–23)
CALCIUM BLD-MCNC: 8.8 MG/DL (ref 8.7–10.4)
CHLORIDE SERPL-SCNC: 103 MMOL/L (ref 98–112)
CO2 SERPL-SCNC: 26 MMOL/L (ref 21–32)
CREAT BLD-MCNC: 1.14 MG/DL
E COLI DNA BLD POS QL NAA+NON-PROBE: DETECTED
EGFRCR SERPLBLD CKD-EPI 2021: 68 ML/MIN/1.73M2 (ref 60–?)
ERYTHROCYTE [DISTWIDTH] IN BLOOD BY AUTOMATED COUNT: 15.3 %
GLOBULIN PLAS-MCNC: 2.9 G/DL (ref 2–3.5)
GLUCOSE BLD-MCNC: 139 MG/DL (ref 70–99)
GLUCOSE BLD-MCNC: 156 MG/DL (ref 70–99)
GLUCOSE BLD-MCNC: 187 MG/DL (ref 70–99)
HCT VFR BLD AUTO: 31.2 %
HGB BLD-MCNC: 10.9 G/DL
MCH RBC QN AUTO: 29.4 PG (ref 26–34)
MCHC RBC AUTO-ENTMCNC: 34.9 G/DL (ref 31–37)
MCV RBC AUTO: 84.1 FL
OSMOLALITY SERPL CALC.SUM OF ELEC: 283 MOSM/KG (ref 275–295)
PLATELET # BLD AUTO: 133 10(3)UL (ref 150–450)
POTASSIUM SERPL-SCNC: 3.7 MMOL/L (ref 3.5–5.1)
PROT SERPL-MCNC: 6.3 G/DL (ref 5.7–8.2)
RBC # BLD AUTO: 3.71 X10(6)UL
SODIUM SERPL-SCNC: 135 MMOL/L (ref 136–145)
WBC # BLD AUTO: 7 X10(3) UL (ref 4–11)

## 2024-10-19 PROCEDURE — 99239 HOSP IP/OBS DSCHRG MGMT >30: CPT | Performed by: INTERNAL MEDICINE

## 2024-10-19 RX ORDER — CEPHALEXIN 500 MG/1
500 CAPSULE ORAL 3 TIMES DAILY
Qty: 21 CAPSULE | Refills: 0 | Status: SHIPPED | OUTPATIENT
Start: 2024-10-19 | End: 2024-10-26

## 2024-10-19 RX ORDER — METRONIDAZOLE 500 MG/1
500 TABLET ORAL EVERY 12 HOURS SCHEDULED
Status: DISCONTINUED | OUTPATIENT
Start: 2024-10-19 | End: 2024-10-19

## 2024-10-19 RX ORDER — HYDROCODONE BITARTRATE AND ACETAMINOPHEN 5; 325 MG/1; MG/1
1 TABLET ORAL EVERY 6 HOURS PRN
Qty: 10 TABLET | Refills: 0 | Status: SHIPPED | OUTPATIENT
Start: 2024-10-19

## 2024-10-19 RX ORDER — ACETAMINOPHEN 500 MG
1000 TABLET ORAL EVERY 6 HOURS
Status: DISCONTINUED | OUTPATIENT
Start: 2024-10-19 | End: 2024-10-19

## 2024-10-19 RX ORDER — CEPHALEXIN 500 MG/1
500 CAPSULE ORAL 3 TIMES DAILY
Status: DISCONTINUED | OUTPATIENT
Start: 2024-10-19 | End: 2024-10-19

## 2024-10-19 RX ORDER — ENALAPRIL MALEATE 20 MG/1
20 TABLET ORAL DAILY
Status: SHIPPED | COMMUNITY
Start: 2024-10-21

## 2024-10-19 RX ORDER — METRONIDAZOLE 500 MG/1
500 TABLET ORAL EVERY 12 HOURS SCHEDULED
Qty: 14 TABLET | Refills: 0 | Status: SHIPPED | OUTPATIENT
Start: 2024-10-19 | End: 2024-10-26

## 2024-10-19 RX ORDER — ACETAMINOPHEN 500 MG
1000 TABLET ORAL ONCE
Status: COMPLETED | OUTPATIENT
Start: 2024-10-19 | End: 2024-10-19

## 2024-10-19 NOTE — PROGRESS NOTES
NURSING DISCHARGE NOTE    Discharged Home via Ambulatory.  Accompanied by Spouse  Belongings Taken by patient/family.

## 2024-10-19 NOTE — DISCHARGE INSTRUCTIONS
Home Care Instructions  Cholecystectomy    MEDICATIONS  For post-operative pain control the medications are usually Norco or Tylenol #3.  These are narcotics and are best taken by starting with one tablet and repeating every four to six hours as needed.  If the patient does not feel they need the narcotics they shouldn’t take them.  If the pain is severe the patient may take up to two pills every four hours.  If a minor supplement is necessary in addition to the narcotics do not overlap with Tylenol (any product with acetaminophen) as both Norco and Tylenol #3 contain Tylenol.  Please supplement with Advil (ibuprofen) or Motrin.  Please ask your surgeon before resuming blood thinners such as aspirin, Plavix or Coumadin.  All other home medications may be resumed as scheduled.  With severe cholecystitis, antibiotics will also be prescribed.  With antibiotics, please watch for rash, facial swelling or severe diarrhea.    DIET  The patient may resume a general diet immediately.  This is not a good time to eat excessively.  The patient should eat in moderation and stick with foods the patient feels are easy to digest.  Avoid high fat foods in the immediate post-operative time period as this may still cause some problems.  The patient may eat anything in small amounts but foods rich in dairy products, fatty foods or fried foods may cause an upset stomach for up to six weeks after surgery.  There should be no alcohol consumption in the immediate recovery time period or within six hours of taking narcotics.    WOUND CARE  The top dressing may be removed the day after surgery.  This includes the gauze, tape and band-aids if they are present.  Do not remove the steri-strips or butterfly tapes that are white and adherent to the skin.  The steri-strips will eventually peel up at the ends and at this point they may be removed.  This is usually seven to ten days after surgery.  The patient may shower the day after surgery.   There is no need to cover the incisions, and all top gauze type dressing should be removed prior to showering.  Soap can get on the wounds but do not scrub over the wounds.  No hair dye or chemicals of any kind should get in the wounds.  Avoid tub baths, swimming or sitting in a hot tub for two weeks.  If visible sutures or staples are present they will be removed in the office by the surgeon or nurse.  Most wounds will be closed with dissolving suture underneath the skin.  These sutures will dissolve on their own.  If a drain is present make sure the patient receives drain care instructions from the nurse prior to discharge.  Most patients will not have a drain.    ACTIVITY  Every day the patient should be up, showered and dressed.  Each day the patient should be up and around the house.  The patient should not lie in bed and should not stay in pajamas.  We count on the patient being up, coughing, walking and deep breathing to avoid pneumonia and blood clots in the legs.  Once a day the patient should get out of the house and go shopping, go to the mall, the [employer] store, the [employer] or a restaurant.  The patient may ride in a car but should not drive the car for at least one week.  Patients should be off narcotics for at least 8 hours prior to being a .  The average time off work is 10 to 14 days; most adults will be seeing the surgeon prior to returning to work.  Students will return to school within 1-5 days after discharge from the hospital but will be off gym, sports, and indoors for recess for one month.  Patients may go up and down stairs and lift up to five pounds but no bending, pushing or pulling.  Nothing called work or exercise until the follow up visit.  No ‘stair-master’, power walking, jogging or workouts until the follow up visit.  Patients should seek further activity limits at the time of their appointment.    APPOINTMENT  Please call our office for an appointment within five to ten days of  discharge.  Any fever greater than 100.5, chills, nausea, vomiting, or severe diarrhea please call our office.  If the wound turns red, hot, swollen, becomes increasingly painful, or drains pus call us immediately at [phone].  For life threatening emergencies call 911.  For non-emergent care please call our office after 8:30 a.m. Monday through Friday.  The number listed above is our office number; our phone automatically switches to our answering service if we are not there.  Please do not use the emergency room for non-urgent care.    Thank you for entrusting us with your care.  [facility]General Surgery

## 2024-10-19 NOTE — PROGRESS NOTES
[facility]  Progress Note    Buddy Meek Patient Status:  Inpatient    1950 MRN [id number]   Location [facility] 3NW-A Attending Jazlyn Goff MD   Hosp Day # 3 PCP Chantelle Enamorado MD     Subjective:  The patient was seen and examined sitting at bedside eating breakfast.  He denies abdominal pain.  He states he is tolerating a soft diet.  He denies nausea or vomiting.  He had a bowel movement overnight without any bright red blood.  He is eager to discharge home.    Objective/Physical Exam:  /79 (BP Location: Right arm)   Pulse 76   Temp 98.5 °F (36.9 °C) (Oral)   Resp 18   Ht 74\"   Wt 220 lb (99.8 kg)   SpO2 96%   BMI 28.25 kg/m²     Intake/Output Summary (Last 24 hours) at 10/19/2024 0941  Last data filed at 10/19/2024 0612  Gross per 24 hour   Intake 771 ml   Output 2775 ml   Net -2004 ml         General: Alert, oriented x3. No acute distress.  HEENT: Normocephalic, atraumatic. No scleral icterus.  Pulmonary: No respiratory distress, effort normal.   Abdomen: Non-distended, without tympany to percussion. Soft, non-tender to palpation. No rebound or guarding. No peritoneal signs.   Incision: Laparoscopic incision sites are clean, dry, intact without surrounding erythema or cellulitis.  Steri-Strips and Band-Aids are in place  Extremities: No lower extremity edema. No clubbing or cyanosis.   Skin: Warm, dry. No jaundice.       Labs:  Lab Results   Component Value Date    WBC 7.0 10/19/2024    HGB 10.9 10/19/2024    HCT 31.2 10/19/2024    .0 10/19/2024      No results found for: \"PT\", \"INR\"  Lab Results   Component Value Date     10/19/2024    K 3.7 10/19/2024     10/19/2024    CO2 26.0 10/19/2024    BUN 11 10/19/2024    CREATSERUM 1.14 10/19/2024     10/19/2024    CA 8.8 10/19/2024    ALKPHO 92 10/19/2024    ALT 29 10/19/2024    AST 43 10/19/2024    BILT 0.6 10/19/2024    ALB 3.4 10/19/2024    TP 6.3 10/19/2024          Assessment:  Patient Active  Problem List   Diagnosis    Acute cholecystitis    Shortness of breath    Calculus of gallbladder with acute cholecystitis without obstruction    Diabetes mellitus type 2 in nonobese (HCC)     POD 2 laparoscopic cholecystectomy  Cholangiogram with concern for stenosis of CBD-MRCP yesterday showed no biliary ductal dilation or evidence of choledocholithiasis  Bright red blood per rectum    Plan:  Overall, the patient is doing well postoperatively.  He may discharge home today from a general surgical standpoint  Recommend outpatient follow-up with GI for EGD and colonoscopy per GI recommendations  Diet as tolerated  Antiemetics and analgesics as needed  Activity restrictions reviewed  DVT prophylaxis heparin  Follow-up with [facility] in 2 weeks      My total face time with this patient was 25 minutes. Greater than half of the visit was spent in counseling the patient on the above listed diagnoses and treatment options.     Giovanna Ty PA-C  10/19/2024  9:41 AM    This note was initiated by Giovanna Ty PA-C.  The PA saw the patient in conjunction with me. The PA performed a history, exam, and developed the assessment and plan. I agree with the mentioned assessment and plan and have provided further documentation of my own, if necessary.    Guanakito Cortés MD  [facility]

## 2024-10-19 NOTE — PROGRESS NOTES
A/Ox4, RA, IVF, IV abx, tolerating a low fiber diet well.  Mild complaints of pain managed with scheduled medication, no complaints of nausea.  Up ad deb, voiding freely.  X1 BM during shift, no blood in stool.  Pt and wife updated on plan of care, call light and belongings within reach, questions and concerns addressed.

## 2024-10-19 NOTE — PROGRESS NOTES
A&Ox4. VSS. RA.   GI: Abdomen firm, rounded. Passing gas.  Denies nausea.  : Voids.  Pain controlled with PRN pain medications  Up ad deb  Incisions: x3 lap sites with steri strips and band aids  Diet: regular  IVF running per order.  All appropriate safety measures in place. Patient updated on plan of care. All questions and concerns addressed.

## 2024-10-19 NOTE — DISCHARGE SUMMARY
[facility]     Buddy Meek Patient Status:  Inpatient    1950 MRN [id number]   Location [facility] 3NW-A Attending No att. providers found   Hosp Day # 3 PCP Chantelle Enamorado MD     Date of Admission: 10/16/2024  Date of Discharge:  10/19/2024     Discharge Disposition: Home or Self Care    Discharge Diagnosis:  Severe sepsis   Ecoli bacteremia  Empyema of gallbladder, severe acute on chronic cholecystitis, cholelithiasis   Lactic acidosis   Leukopenia   Pancreatic duct dilatation on IOC   Hematochezia, suspected diverticular; will need outpatient scopes  Normocytic anemia  Component of iron deficiency   Hyponatremia  Hypokalemia  Thrombocytopenia 2/2 sepsis  CKD stage IIIA   DMII  Hypertension  GERD    History of Present Illness   74 year old male with PMHx BPH/ DM/ HTN who presented to the hospital for dyspnea. He had intermittent diffuse abdominal bloating for the past 5 days as well as nausea and emesis. He was still able to eat a little and keep down fluids for the most part. This afternoon he suddenly developed shaking chills as well as dyspnea and this decided to seek medical eval. He denied any fever, bloody stools, diarrhea, constipation. He notes issues with his gallbladder about 30 years ago but has not had problems since then.     Brief Synopsis:   #Severe sepsis   #Ecoli bacteremia   #Empyema of gallbladder, severe acute on chronic cholecystitis, cholelithiasis   #lactic acidosis  #Leukopenia - resolved  Presented with dyspnea. CTA chest A/P showing acute cholecystitis, small hypoattenuating focus within wall of gallbladder fundus concerning for focis of wall peforation, mild reactive duodenitis. S/p cholecystectomy. Blood cultures with Ecoli S to keflex. He was treated with IV zosyn -> keflex/flagyl at discharge x 7 days. Repeat cultures NGTD  -Pathology pending     #IOC abnormal with pancreatic duct dilation   MRCP negative for abnormal findings. Post op  seroma. GI follow up locally    #Hematochezia   Suspected to be hemorrhoidal. Hgb stable. Patient to follow up with local GI for outpatient scopes.     Lace+ Score: 46  59-90 High Risk  29-58 Medium Risk  0-28   Low Risk       TCM Follow-Up Recommendation:  LACE 29-58: Moderate Risk of readmission after discharge from the hospital.      Procedures during hospitalization:   Cholecystectomy    Incidental or significant findings and recommendations (brief descriptions):  Incidental pancreatic duct dilatation noted on IOC    Lab/Test results pending at Discharge:   Surgical pathology     Consultants:  Surgery, GI    Discharge Medication List:     Discharge Medications        START taking these medications        Instructions Prescription details   cephALEXin 500 MG Caps  Commonly known as: Keflex      Take 1 capsule (500 mg total) by mouth 3 (three) times daily for 7 days.   Stop taking on: October 26, 2024  Quantity: 21 capsule  Refills: 0     HYDROcodone-acetaminophen 5-325 MG Tabs  Commonly known as: Norco      Take 1 tablet by mouth every 6 (six) hours as needed for Pain.   Quantity: 10 tablet  Refills: 0     metRONIDAZOLE 500 MG Tabs  Commonly known as: Flagyl      Take 1 tablet (500 mg total) by mouth every 12 (twelve) hours for 7 days.   Stop taking on: October 26, 2024  Quantity: 14 tablet  Refills: 0            CHANGE how you take these medications        Instructions Prescription details   enalapril 20 MG Tabs  Commonly known as: Vasotec  Start taking on: October 21, 2024  What changed: These instructions start on October 21, 2024. If you are unsure what to do until then, ask your doctor or other care provider.      Take 1 tablet (20 mg total) by mouth daily.   Refills: 0            CONTINUE taking these medications        Instructions Prescription details   amLODIPine 10 MG Tabs  Commonly known as: Norvasc      Take 1 tablet (10 mg total) by mouth daily.   Refills: 0     metFORMIN HCl 1000 MG Tabs  Commonly  known as: GLUCOPHAGE      Take 1 tablet (1,000 mg total) by mouth 2 (two) times daily with meals.   Refills: 0     naproxen 500 MG Tabs  Commonly known as: Naprosyn      Take 1 tablet (500 mg total) by mouth 2 (two) times daily as needed.   Quantity: 20 tablet  Refills: 0     orphenadrine  MG Tb12  Commonly known as: Norflex ER      Take 100 mg by mouth 2 (two) times daily as needed (stiffness or spasm).   Quantity: 10 tablet  Refills: 0     pantoprazole 20 MG Tbec  Commonly known as: Protonix      Take 1 tablet (20 mg total) by mouth every morning before breakfast.   Refills: 0               Where to Get Your Medications        These medications were sent to [id number] - [place] - [address] [phone], [phone]  [address], [place] [address]      Phone: [phone]   cephALEXin 500 MG Caps  HYDROcodone-acetaminophen 5-325 MG Tabs  metRONIDAZOLE 500 MG Tabs         [place]    Follow-up appointment:   [facility]   [facility] [address] [phone]    Follow up in 2 week(s)      Ginette Alvarado MD   [facility] [address] [phone]    Follow up  As needed    GI doctor    Schedule an appointment as soon as possible for a visit      Chantelle Enamorado MD    Call in 1 week(s)  [facility] follow up    Appointments for Next 30 Days 10/19/2024 - 2024      None            Vital signs:  Temp:  [97.8 °F (36.6 °C)-98.5 °F (36.9 °C)] 97.8 °F (36.6 °C)  Pulse:  [74-78] 77  Resp:  [18] 18  BP: (140-149)/(71-80) 145/80  SpO2:  [92 %-96 %] 95 %    Physical Exam:    General: No acute distress   Lungs: clear to auscultation  Cardiovascular: S1, S2  Abdomen: Soft    -----------------------------------------------------------------------------------------------  PATIENT DISCHARGE INSTRUCTIONS: See electronic chart    Luz Marina Bullock MD    Total time spent on discharge plannin minutes     The  [employer] Cures Act  makes medical notes like these available to patients in the interest of transparency. Please be advised this is a medical document. Medical documents are intended to carry relevant information, facts as evident, and the clinical opinion of the practitioner. The medical note is intended as peer to peer communication and may appear blunt or direct. It is written in medical language and may contain abbreviations or verbiage that are unfamiliar.

## 2024-10-24 NOTE — CDS QUERY
DOCUMENTATION CLARIFICATION QUERY  Dear Dr. Bullock,  Can the etiology of Hematochezia be clarified?     [   ] Hematochezia, suspected diverticular    [ X  ] Hematochezia, suspected to be hemorrhoidal   [   ] Other (please specify): ___________________    CLINICAL INDICATORS:   10/16/24 CTA ABD: Few scattered colonic diverticula.  10/18/24 Hospitalist: #Hematochezia Hemorrhoidal? Diverticular?  10/18 Surgeon: Patient had episodes of rectal bleeding overnight and early this morning.  There are external hemorrhoids without any active rectal bleeding.  10/18 GI: Patient also had an episode of rectal bleeding this morning. Suspect this may  be secondary to hemorrhoids but will likely need further outpatient workup to  rule out polyps, masses, IBD. No further episodes  10/19/24 D/C Summary:   Discharge diagnosis: Hematochezia, suspected diverticular   Brief synopsis: #Hematochezia  Suspected to be hemorrhoidal    RISK FACTORS:  Empyema of the gallbladder, sever acute on chronic cholecystitis, cholelithiasis     TREATMENT:  GI consult - 10/17/24  Procedure performed:  Laparoscopic cholecystectomy with ICG cholangiogram, intraoperative cholangiogram fluoroscopy, repair umbilical hernia, placement of  Surgiflo and Surgicel - Patient to follow up with local GI for outpatient scopes.                Use of terms such as suspected, possible, or probable (associated with a specific diagnosis that is being evaluated, monitored, or treated as if it exists) are acceptable and can be coded in the inpatient setting, when documented at the time of discharge.     Lilliam Clark RN, BSN, CWOCN [facility]  [phone]                                                                                    THIS FORM IS A PART OF THE PERMANENT MEDICAL RECORD

## 2024-11-01 ENCOUNTER — OFFICE VISIT (OUTPATIENT)
Facility: LOCATION | Age: 74
End: 2024-11-01
Payer: MEDICARE

## 2024-11-01 VITALS
OXYGEN SATURATION: 95 % | HEIGHT: 71 IN | TEMPERATURE: 98 F | HEART RATE: 100 BPM | DIASTOLIC BLOOD PRESSURE: 70 MMHG | WEIGHT: 220 LBS | BODY MASS INDEX: 30.8 KG/M2 | SYSTOLIC BLOOD PRESSURE: 118 MMHG

## 2024-11-01 DIAGNOSIS — Z98.890 POSTOPERATIVE STATE: Primary | ICD-10-CM

## 2024-11-01 DIAGNOSIS — Z90.49 HISTORY OF CHOLECYSTECTOMY: ICD-10-CM

## 2024-11-01 RX ORDER — DULAGLUTIDE 0.75 MG/.5ML
INJECTION, SOLUTION SUBCUTANEOUS
COMMUNITY
Start: 2024-10-26

## 2024-11-01 RX ORDER — SILDENAFIL 100 MG/1
100 TABLET, FILM COATED ORAL DAILY PRN
COMMUNITY
Start: 2024-08-09

## 2024-11-01 RX ORDER — BLOOD SUGAR DIAGNOSTIC
STRIP MISCELLANEOUS DAILY
COMMUNITY
Start: 2024-09-01

## 2024-11-01 RX ORDER — POLYETHYLENE GLYCOL 3350 17 G/17G
17 POWDER, FOR SOLUTION ORAL DAILY PRN
Qty: 30 EACH | Refills: 0 | Status: SHIPPED | OUTPATIENT
Start: 2024-11-01

## 2024-11-01 RX ORDER — NEOMYCIN SULFATE, POLYMYXIN B SULFATE AND HYDROCORTISONE 10; 3.5; 1 MG/ML; MG/ML; [USP'U]/ML
SUSPENSION/ DROPS AURICULAR (OTIC)
COMMUNITY
Start: 2024-06-29

## 2024-11-01 RX ORDER — HYDROCORTISONE 25 MG/G
CREAM TOPICAL
COMMUNITY
Start: 2024-10-18

## 2024-11-01 RX ORDER — DOCUSATE SODIUM 100 MG/1
100 CAPSULE, LIQUID FILLED ORAL DAILY
Qty: 30 CAPSULE | Refills: 0 | Status: SHIPPED | OUTPATIENT
Start: 2024-11-01

## 2024-11-01 RX ORDER — CLOTRIMAZOLE 1 %
CREAM (GRAM) TOPICAL
COMMUNITY
Start: 2024-09-29

## 2024-11-01 NOTE — PROGRESS NOTES
Follow Up Visit Note       Active Problems      1. Postoperative state    2. History of cholecystectomy          Chief Complaint   Chief Complaint   Patient presents with    Post-Op     PO - 10/17 w/BCK, LAPAROSCOPIC CHOLECYSTECTOMY WITH INDOCYANINE GREEN AND INTRAOPERATIVE CHOLANGIOGRAM, REPAIR OF UMBILICAL HERNIA, no symptoms.         History of Present Illness  Buddy is a 74 year old male who underwent laparoscopic cholecystectomy and repair of umbilical hernia on 10/17/2024 with Dr. Alvarado. He presents to clinic today for follow up evaluation.    He reports overall doing well following surgery. He denies abdominal pain. He reports poor appetite, but denies any nausea or vomiting. He reports constipation that began 2 days ago. He reports small amount of hematochezia when straining with bowel movements. He denies passing clots or large amounts of blood. He denies fever or chills. He reports feeling fatigued following surgery. He denies dizziness, shortness of breath, chest pain or lower extremity swelling.     Specimen pathology as below:  Gallbladder, cholecystectomy:  -Acute cholecystitis with cholelithiasis.  Allergies  Buddy is allergic to rocephin [ceftriaxone].    Past Medical / Surgical / Social / Family History    The past medical and past surgical history have been reviewed by me today.    Past Medical History:    BPH (benign prostatic hyperplasia)    Diabetes (HCC)    Essential hypertension    High blood pressure     Past Surgical History:   Procedure Laterality Date    Appendectomy      Cholecystectomy  10/16/24    Laparoscopic cholecystectomy  10/16/24    Laparoscopy, surgical prostatectomy, retropubic radical, w/nerve sparing  1922       The family history and social history have been reviewed by me today.    History reviewed. No pertinent family history.  Social History     Socioeconomic History    Marital status: Single   Tobacco Use    Smoking status: Never    Smokeless tobacco: Never   Vaping Use     Vaping status: Never Used   Substance and Sexual Activity    Alcohol use: Never    Drug use: Never   Other Topics Concern    Exercise Yes        Current Outpatient Medications:     clotrimazole 1 % External Cream, APPLY A SMALL AMOUNT TOPICALLY ONCE A DAY TO AFFECTED AREA AS DIRECTED, Disp: , Rfl:     TRULICITY 0.75 MG/0.5ML Subcutaneous Solution Auto-injector, , Disp: , Rfl:     ONETOUCH ULTRA In Vitro Strip, by In Vitro route daily., Disp: , Rfl:     hydrocortisone 2.5 % External Cream, APPLY A SMALL AMOUNT OF CREAM TO AFFECTED AREA ONCE DAILY, Disp: , Rfl:     neomycin-polymyxin-hydrocortisone 3.5-16433-7 Otic Suspension, INSTILL 4 DROPS INTO LEFT EAR 4 TIMES DAILY FOR 10 DAYS, Disp: , Rfl:     Sildenafil Citrate 100 MG Oral Tab, Take 1 tablet (100 mg total) by mouth daily as needed., Disp: , Rfl:     Polyethylene Glycol 3350 17 g Oral Powd Pack, Take 17 g by mouth daily as needed., Disp: 30 each, Rfl: 0    docusate sodium (COLACE) 100 MG Oral Cap, Take 1 capsule (100 mg total) by mouth daily., Disp: 30 capsule, Rfl: 0    enalapril 20 MG Oral Tab, Take 1 tablet (20 mg total) by mouth daily., Disp: , Rfl:     orphenadrine  MG Oral Tablet 12 Hr, Take 100 mg by mouth 2 (two) times daily as needed (stiffness or spasm)., Disp: 10 tablet, Rfl: 0    amLODIPine 10 MG Oral Tab, Take 1 tablet (10 mg total) by mouth daily., Disp: , Rfl:     pantoprazole 20 MG Oral Tab EC, Take 1 tablet (20 mg total) by mouth every morning before breakfast., Disp: , Rfl:     MetFORMIN HCl 1000 MG Oral Tab, Take 1 tablet (1,000 mg total) by mouth 2 (two) times daily with meals., Disp: , Rfl:      Review of Systems  The Review of Systems has been reviewed by me during today.  Review of Systems   Constitutional:  Negative for appetite change, chills, fatigue and fever.   Respiratory:  Negative for chest tightness and shortness of breath.    Gastrointestinal:  Negative for abdominal pain, diarrhea, nausea and vomiting.   Genitourinary:   Negative for difficulty urinating, dysuria and urgency.   Skin:  Negative for rash and wound.        Physical Findings   /70 (BP Location: Left arm, Patient Position: Sitting, Cuff Size: adult)   Pulse 100   Temp 98 °F (36.7 °C) (Temporal)   Ht 71\"   Wt 220 lb (99.8 kg)   SpO2 95%   BMI 30.68 kg/m²   Physical Exam  Vitals reviewed.   Constitutional:       General: He is not in acute distress.     Appearance: Normal appearance. He is not ill-appearing.   HENT:      Head: Normocephalic and atraumatic.   Eyes:      General: No scleral icterus.     Conjunctiva/sclera: Conjunctivae normal.   Pulmonary:      Effort: Pulmonary effort is normal. No respiratory distress.   Abdominal:      General: Abdomen is flat. There is no distension.      Palpations: Abdomen is soft.      Tenderness: There is no abdominal tenderness. There is no guarding or rebound.      Comments: Abdomen soft, non-distended, non-tender to palpation. Laparoscopic incision x 4 are clean, dry and healing appropriately. No surrounding erythema or drainage. No fluctuance to palpation. No signs of infection. Bandaids and Steri strips in place. Band aids were removed at todays visit.      Skin:     General: Skin is warm and dry.      Coloration: Skin is not jaundiced.   Neurological:      Mental Status: He is alert.   Psychiatric:         Mood and Affect: Mood normal.         Behavior: Behavior normal.          Assessment   1. Postoperative state    2. History of cholecystectomy        Plan   The patient is doing well postoperatively.  Continue Diet as tolerated. Encourage patient to add nutritional supplements such as ensure if having poor appetite.  Begin bowel regimen for constipation- I will send Miralax once daily and colace once daily to patients pharmacy. Should take as needed for constipation. I did discuss with the patient that the hematochezia is likely related to hemorrhoids, but he should follow up with [facility] to discuss  Colonoscopy  He should follow up with [facility] regarding Pancreatic ductal dilatation. Dr. Goldman office information was provided to the patient at todays visit.   Tylenol and Ibuprofen as needed for pain control.   Continue local wound care, soap and water to the incisions.   Pathology discussed with the patient.   Recommend Lifting restrictions of no greater than 15-20 lbs for 4/6 weeks postoperatively  All the patient's questions and concerns were addressed. They voiced understanding and are in agreement with the plan       Follow Up  They may follow up with [facility] general surgery on an as-needed basis.      Lucie Xie PA-C

## 2024-11-06 PROBLEM — R33.9 INCOMPLETE BLADDER EMPTYING: Status: ACTIVE | Noted: 2018-08-14

## 2024-11-06 PROBLEM — N40.1 BENIGN PROSTATIC HYPERPLASIA WITH LOWER URINARY TRACT SYMPTOMS: Status: ACTIVE | Noted: 2023-01-04

## 2024-11-06 PROBLEM — K21.9 GASTROESOPHAGEAL REFLUX DISEASE WITHOUT ESOPHAGITIS: Status: ACTIVE | Noted: 2018-05-01

## 2025-02-06 ENCOUNTER — HOSPITAL ENCOUNTER (OUTPATIENT)
Facility: HOSPITAL | Age: 75
Setting detail: HOSPITAL OUTPATIENT SURGERY
Discharge: HOME | End: 2025-02-06
Attending: INTERNAL MEDICINE | Admitting: INTERNAL MEDICINE
Payer: MEDICARE

## 2025-02-06 ENCOUNTER — ANESTHESIA EVENT (OUTPATIENT)
Dept: ENDOSCOPY | Facility: HOSPITAL | Age: 75
End: 2025-02-06
Payer: MEDICARE

## 2025-02-06 ENCOUNTER — ANESTHESIA (OUTPATIENT)
Dept: ENDOSCOPY | Facility: HOSPITAL | Age: 75
End: 2025-02-06
Payer: MEDICARE

## 2025-02-06 VITALS
RESPIRATION RATE: 17 BRPM | BODY MASS INDEX: 32.58 KG/M2 | TEMPERATURE: 97 F | DIASTOLIC BLOOD PRESSURE: 85 MMHG | SYSTOLIC BLOOD PRESSURE: 130 MMHG | HEIGHT: 69 IN | WEIGHT: 220 LBS | OXYGEN SATURATION: 96 % | HEART RATE: 76 BPM

## 2025-02-06 DIAGNOSIS — K86.89 DILATION OF PANCREATIC DUCT (HCC): ICD-10-CM

## 2025-02-06 DIAGNOSIS — K92.1 HEMATOCHEZIA: ICD-10-CM

## 2025-02-06 DIAGNOSIS — D64.9 ANEMIA, UNSPECIFIED TYPE: ICD-10-CM

## 2025-02-06 DIAGNOSIS — D63.0 ANEMIA IN NEOPLASTIC DISEASE: ICD-10-CM

## 2025-02-06 LAB — GLUCOSE BLD-MCNC: 92 MG/DL (ref 70–99)

## 2025-02-06 PROCEDURE — BF4CZZZ ULTRASONOGRAPHY OF HEPATOBILIARY SYSTEM, ALL: ICD-10-PCS | Performed by: INTERNAL MEDICINE

## 2025-02-06 PROCEDURE — 0DB98ZX EXCISION OF DUODENUM, VIA NATURAL OR ARTIFICIAL OPENING ENDOSCOPIC, DIAGNOSTIC: ICD-10-PCS | Performed by: INTERNAL MEDICINE

## 2025-02-06 PROCEDURE — 0DJD8ZZ INSPECTION OF LOWER INTESTINAL TRACT, VIA NATURAL OR ARTIFICIAL OPENING ENDOSCOPIC: ICD-10-PCS | Performed by: INTERNAL MEDICINE

## 2025-02-06 PROCEDURE — 88305 TISSUE EXAM BY PATHOLOGIST: CPT | Performed by: INTERNAL MEDICINE

## 2025-02-06 PROCEDURE — 82962 GLUCOSE BLOOD TEST: CPT

## 2025-02-06 PROCEDURE — 0DB68ZX EXCISION OF STOMACH, VIA NATURAL OR ARTIFICIAL OPENING ENDOSCOPIC, DIAGNOSTIC: ICD-10-PCS | Performed by: INTERNAL MEDICINE

## 2025-02-06 RX ORDER — ONDANSETRON 2 MG/ML
4 INJECTION INTRAMUSCULAR; INTRAVENOUS ONCE AS NEEDED
Status: DISCONTINUED | OUTPATIENT
Start: 2025-02-06 | End: 2025-02-06

## 2025-02-06 RX ORDER — NICOTINE POLACRILEX 4 MG
30 LOZENGE BUCCAL
Status: DISCONTINUED | OUTPATIENT
Start: 2025-02-06 | End: 2025-02-06

## 2025-02-06 RX ORDER — DEXTROSE MONOHYDRATE 25 G/50ML
50 INJECTION, SOLUTION INTRAVENOUS
Status: DISCONTINUED | OUTPATIENT
Start: 2025-02-06 | End: 2025-02-06

## 2025-02-06 RX ORDER — SODIUM CHLORIDE, SODIUM LACTATE, POTASSIUM CHLORIDE, CALCIUM CHLORIDE 600; 310; 30; 20 MG/100ML; MG/100ML; MG/100ML; MG/100ML
INJECTION, SOLUTION INTRAVENOUS CONTINUOUS
Status: DISCONTINUED | OUTPATIENT
Start: 2025-02-06 | End: 2025-02-06

## 2025-02-06 RX ORDER — LIDOCAINE HYDROCHLORIDE 10 MG/ML
INJECTION, SOLUTION EPIDURAL; INFILTRATION; INTRACAUDAL; PERINEURAL AS NEEDED
Status: DISCONTINUED | OUTPATIENT
Start: 2025-02-06 | End: 2025-02-06 | Stop reason: SURG

## 2025-02-06 RX ORDER — NALOXONE HYDROCHLORIDE 0.4 MG/ML
0.08 INJECTION, SOLUTION INTRAMUSCULAR; INTRAVENOUS; SUBCUTANEOUS ONCE AS NEEDED
Status: DISCONTINUED | OUTPATIENT
Start: 2025-02-06 | End: 2025-02-06

## 2025-02-06 RX ORDER — NICOTINE POLACRILEX 4 MG
15 LOZENGE BUCCAL
Status: DISCONTINUED | OUTPATIENT
Start: 2025-02-06 | End: 2025-02-06

## 2025-02-06 RX ADMIN — LIDOCAINE HYDROCHLORIDE 5 ML: 10 INJECTION, SOLUTION EPIDURAL; INFILTRATION; INTRACAUDAL; PERINEURAL at 14:40:00

## 2025-02-06 NOTE — DISCHARGE INSTRUCTIONS
Home Care Instructions for Colonoscopy and/or Gastroscopy with Sedation    Diet:  - Resume your regular diet .  - Start with light meals to minimize bloating.  - Do not drink alcohol today.    Medication:  - If you have questions about resuming your normal medications, please contact your Primary Care Physician.    Activities:  - Take it easy today. Do not return to work today.  - Do not drive today.  - Do not operate any machinery today (including kitchen equipment).    Colonoscopy:  - You may notice some rectal \"spotting\" (a little blood on the toilet tissue) for a day or two after the exam. This is normal.  - If you experience any rectal bleeding (not spotting), persistent tenderness or sharp severe abdominal pains, oral temperature over 100 degrees Fahrenheit, light-headedness or dizziness, or any other problems, contact your doctor.    Gastroscopy:  - You may have a sore throat for 2-3 days following the exam. This is normal. Gargling with warm salt water (1/2 tsp salt to 1 glass warm water) or using throat lozenges will help.  - If you experience any sharp pain in your neck, abdomen or chest, vomiting of blood, oral temperature over 100 degrees Fahrenheit, light-headedness or dizziness, or any other problems, contact your doctor.    **If unable to reach your doctor, please go to the [facility] Emergency Room**    - Your referring physician will receive a full report of your examination.  - If you do not hear from your doctor's office within two weeks of your biopsy, please call them for your results.

## 2025-02-06 NOTE — ANESTHESIA PREPROCEDURE EVALUATION
PRE-OP EVALUATION    Patient Name: Buddy Meek    Admit Diagnosis: Dilation of pancreatic duct (HCC) [K86.89]  Hematochezia [K92.1]  Anemia, unspecified type [D64.9]  Anemia in neoplastic disease [D63.0]    Pre-op Diagnosis: Dilation of pancreatic duct (HCC) [K86.89]  Hematochezia [K92.1]  Anemia, unspecified type [D64.9]  Anemia in neoplastic disease [D63.0]    ENDOSCOPIC ULTRASOUND (EUS) WITH ESOPHAGOGASTRODUODENOSCOPY WITH BIOPSIES, COLONOSCOPY    Anesthesia Procedure: ENDOSCOPIC ULTRASOUND (EUS) WITH ESOPHAGOGASTRODUODENOSCOPY WITH BIOPSIES, COLONOSCOPY  COLONOSCOPY    Surgeons and Role:     * Mitesh Peterson MD - Primary    Pre-op vitals reviewed.  Temp: 97.4 °F (36.3 °C)  Pulse: 82  Resp: 17  BP: 130/71  SpO2: 97 %  Body mass index is 32.49 kg/m².    Current medications reviewed.  Hospital Medications:   glucose (Dex4) 15 GM/59ML oral liquid 15 g  15 g Oral Q15 Min PRN    Or    glucose (Glutose) 40% oral gel 15 g  15 g Oral Q15 Min PRN    Or    glucose-vitamin C (Dex-4) chewable tab 4 tablet  4 tablet Oral Q15 Min PRN    Or    dextrose 50% injection 50 mL  50 mL Intravenous Q15 Min PRN    Or    glucose (Dex4) 15 GM/59ML oral liquid 30 g  30 g Oral Q15 Min PRN    Or    glucose (Glutose) 40% oral gel 30 g  30 g Oral Q15 Min PRN    Or    glucose-vitamin C (Dex-4) chewable tab 8 tablet  8 tablet Oral Q15 Min PRN    lactated ringers infusion   Intravenous Continuous       Outpatient Medications:   Prescriptions Prior to Admission[1]    Allergies: Rocephin [ceftriaxone]      Anesthesia Evaluation        Anesthetic Complications  (-) history of anesthetic complications         GI/Hepatic/Renal      (+) GERD                           Cardiovascular            MET: >4      (+) hypertension                                     Endo/Other      (+) diabetes  type 2,                          Pulmonary               (+) shortness of breath            Neuro/Psych                                      Past Surgical  History:   Procedure Laterality Date    Appendectomy  Years ago    Cholecystectomy  10/16/24    Colonoscopy  7years ago    Hernia surgery  10/16/24    Laparoscopic cholecystectomy  10/16/24    Laparoscopy, surgical prostatectomy, retropubic radical, w/nerve sparing  1922     Social History     Socioeconomic History    Marital status: Single   Tobacco Use    Smoking status: Never    Smokeless tobacco: Never   Vaping Use    Vaping status: Never Used   Substance and Sexual Activity    Alcohol use: Not Currently     Comment: rare    Drug use: Never   Other Topics Concern    Exercise Yes     History   Drug Use Unknown     Available pre-op labs reviewed.               Airway      Mallampati: III  Mouth opening: 3 FB  TM distance: > 6 cm  Neck ROM: full Cardiovascular    Cardiovascular exam normal.         Dental    Dentition appears grossly intact         Pulmonary    Pulmonary exam normal.                 Other findings              ASA: 2   Plan: MAC  NPO status verified and patient meets guidelines.    Post-procedure pain management plan discussed with surgeon and patient.      Plan/risks discussed with: patient and spouse                Present on Admission:  **None**             [1]   Medications Prior to Admission   Medication Sig Dispense Refill Last Dose/Taking    TRULICITY 0.75 MG/0.5ML Subcutaneous Solution Auto-injector thursday   Past Week    Polyethylene Glycol 3350 17 g Oral Powd Pack Take 17 g by mouth daily as needed. 30 each 0 Taking As Needed    enalapril 20 MG Oral Tab Take 1 tablet (20 mg total) by mouth daily.   2/6/2025    amLODIPine 10 MG Oral Tab Take 1 tablet (10 mg total) by mouth daily.   2/5/2025    MetFORMIN HCl 1000 MG Oral Tab Take 1 tablet (1,000 mg total) by mouth 2 (two) times daily with meals.   2/5/2025    PEG 3350-KCl-Na Bicarb-NaCl 420 g Oral Recon Soln Take as directed by your doctor 4000 mL 0     hydrocortisone (PROCTOZONE-HC) 2.5 % External Cream Pea size to rectal area twice daily  28 g 0     clotrimazole 1 % External Cream APPLY A SMALL AMOUNT TOPICALLY ONCE A DAY TO AFFECTED AREA AS DIRECTED   Unknown    ONETOUCH ULTRA In Vitro Strip by In Vitro route daily.       hydrocortisone 2.5 % External Cream APPLY A SMALL AMOUNT OF CREAM TO AFFECTED AREA ONCE DAILY   Unknown    Sildenafil Citrate 100 MG Oral Tab Take 1 tablet (100 mg total) by mouth daily as needed.   Unknown    orphenadrine  MG Oral Tablet 12 Hr Take 100 mg by mouth 2 (two) times daily as needed (stiffness or spasm). 10 tablet 0 More than a month    pantoprazole 20 MG Oral Tab EC Take 1 tablet (20 mg total) by mouth every morning before breakfast.   More than a month

## 2025-02-06 NOTE — OPERATIVE REPORT
Buddy Meek Patient Status:  Hospital Outpatient Surgery    1950 MRN [id number]   [facility] ENDOSCOPY [facility]IN [facility]ENTER Attending Mitesh Peterson MD   Date 2025 PCP Chantelle Enamorado MD     PREOPERATIVE DIAGNOSIS/INDICATION: Abnormal imaging biliary tree  POSTOPERTATIVE DIAGNOSIS: Chronic pancreatitis changes, post cholecystectomy changes  PROCEDURE PERFORMED: EUS/EGD biopsy  TIME OUT WAS PERFORMED    SEDATION: MAC sedation provided by General Anesthesia    INFORMED CONSENT: Risks, benefits and alternatives to the procedure were explained to the patient including but not limited to bleeding, infection, perforation, adverse drug reactions, pancreatitis and the need for hospitalization and surgery if this occurs, the patient understands and agrees to procedure.  PROCEDURE DESCRIPTION: The upper endoscope and later the therapeutic side viewing echoendoscope were introduced into the patient’s mouth, hypo pharynx, esophagus, stomach and the first and second portion of the duodenum, straightening of the endoscope was performed to obtain a direct view of the major ampulla, retroflexion was performed in the stomach with the EGD scope only. Careful examination of the above described areas was performed on withdrawal of the endoscope. The patient tolerated the procedure well and there were no immediate complications noted during the procedure, the patient was transported to the recovery area in stable condition.  FINDINGS:  ESOPHAGUS: Normal  GEJ: Normal  STOMACH: Normal  DUODENUM: Normal  MAJOR AMPULLA: Normal  ECHO: Imaging was performed through the esophagus, stomach and duodenum. The celiac axis appear wnl,  the visualized portions of the left hepatic lobe showed a large anechoic cyst, the visualized pancreatic parenchyma showed hyperechoic stranding, the main PD was mildly dilated at the head, the GB was absent, the biliary tree appear wnl, the confluence of the portal vein, superior  mesenteric vein and splenic vein appear wnl.  THERAPEUTICS: Cold forceps biopsies were performed from duodenum, antrum, esophagus  RECOMMENDATIONS:   Post EUS/EGD precautions, watch for bleeding, infection, perforation, adverse drug reactions and pancreatitis.  Call status report post procedure.  Follow biopsies.    Mitesh Peterson MD  2/6/2025  3:11 PM

## 2025-02-06 NOTE — ANESTHESIA POSTPROCEDURE EVALUATION
[facility]    Buddy Meek Patient Status:  Hospital Outpatient Surgery   Age/Gender 74 year old male MRN [id number]   Location [facility] ENDOSCOPY [facility]IN [facility] Attending Mitesh Peterson MD   Hosp Day # 0 PCP Chantelle Enamorado MD       Anesthesia Post-op Note    ENDOSCOPIC ULTRASOUND (EUS) WITH ESOPHAGOGASTRODUODENOSCOPY WITH BIOPSIES, COLONOSCOPY    Procedure Summary       Date: 02/06/25 Room / Location:  ENDOSCOPY 02 / [facility]    Anesthesia Start: 1438 Anesthesia Stop: 1513    Procedures:       ENDOSCOPIC ULTRASOUND (EUS) WITH ESOPHAGOGASTRODUODENOSCOPY WITH BIOPSIES, COLONOSCOPY      COLONOSCOPY Diagnosis:       Dilation of pancreatic duct (HCC)      Hematochezia      Anemia, unspecified type      Anemia in neoplastic disease      (EGD/EUS: NORMAL, CHRONIC PANCREATITIS COLON: DIVERTICULOSIS)    Surgeons: Mitesh Peterson MD Anesthesiologist: Rudy Benavides MD    Anesthesia Type: MAC ASA Status: Not recorded            Anesthesia Type: MAC    Vitals Value Taken Time   BP 80/53 02/06/25 1513   Temp  02/06/25 1513   Pulse 75 02/06/25 1513   Resp 15 02/06/25 1513   SpO2 92 02/06/25 1513           Patient Location: Endoscopy    Anesthesia Type: MAC    Airway Patency: patent    Postop Pain Control: adequate    Mental Status: preanesthetic baseline    Nausea/Vomiting: none    Cardiopulmonary/Hydration status: stable euvolemic    Complications: no apparent anesthesia related complications    Postop vital signs: stable    Dental Exam: Unchanged from Preop    Patient to be discharged home when criteria met.

## 2025-02-06 NOTE — H&P
[facility]  Pre-op H and P    Buddy Meek Patient Status:  Hospital Outpatient Surgery    1950 MRN [id number]   Location [facility] ENDOSCOPY PAIN CENTER Attending Mitesh Peterson MD   Date 2025 PCP Chantelle Enamorado MD     CC: ABnormal imaging biliary tree, elevated LFT's, RICHARD    History of Present Illness:  Buddy Meek is a a(n) 74 year old male. ABnormal imaging biliary tree, elevated LFT's, RICHARD    History:  Past Medical History:    Back pain    Bloating    BPH (benign prostatic hyperplasia)    Cancer (HCC)    Constipation    Diabetes (HCC)    Diabetes mellitus (HCC)    Disorder of prostate    Years    Dizziness    Esophageal reflux    Essential hypertension    Flatulence/gas pain/belching    Heartburn    Hemorrhoids    Inside    High blood pressure    Indigestion    Irregular bowel habits    Loss of appetite    Night sweats    Pain with bowel movements    Sleep disturbance    Stool incontinence    Uncomfortable fullness after meals    Visual impairment    glasses    Wears glasses    Wheezing     Past Surgical History:   Procedure Laterality Date    Appendectomy  Years ago    Cholecystectomy  10/16/24    Colonoscopy  7years ago    Hernia surgery  10/16/24    Laparoscopic cholecystectomy  10/16/24    Laparoscopy, surgical prostatectomy, retropubic radical, w/nerve sparing       History reviewed. No pertinent family history.   reports that he has never smoked. He has never used smokeless tobacco. He reports that he does not currently use alcohol. He reports that he does not use drugs.    Allergies:  Allergies[1]    Medications:    Current Facility-Administered Medications:     glucose (Dex4) 15 GM/59ML oral liquid 15 g, 15 g, Oral, Q15 Min PRN **OR** glucose (Glutose) 40% oral gel 15 g, 15 g, Oral, Q15 Min PRN **OR** glucose-vitamin C (Dex-4) chewable tab 4 tablet, 4 tablet, Oral, Q15 Min PRN **OR** dextrose 50% injection 50 mL, 50 mL, Intravenous, Q15 Min PRN **OR**  glucose (Dex4) 15 GM/59ML oral liquid 30 g, 30 g, Oral, Q15 Min PRN **OR** glucose (Glutose) 40% oral gel 30 g, 30 g, Oral, Q15 Min PRN **OR** glucose-vitamin C (Dex-4) chewable tab 8 tablet, 8 tablet, Oral, Q15 Min PRN    lactated ringers infusion, , Intravenous, Continuous    Physical Exam:    Height 5' 9\" (1.753 m), weight 220 lb (99.8 kg).    General: Appears alert, oriented x3 and in no acute distress.  CV: Normal rate   Lungs: Normal effort   Skin: Warm and dry.  Laboratory Data:       Imaging:      Assessment/Plan/Procedure:  Patient Active Problem List   Diagnosis    Acute cholecystitis    Shortness of breath    Calculus of gallbladder with acute cholecystitis without obstruction    Diabetes mellitus type 2 in nonobese (HCC)    Benign prostatic hyperplasia with lower urinary tract symptoms    Gastroesophageal reflux disease without esophagitis    Incomplete bladder emptying    Prostate cancer (HCC)       ABnormal imaging biliary tree, elevated LFT's, RICHARD    Plan;  EGD /EUS and colonoscopy    Mitesh Peterson MD  2/6/2025  1:17 PM       [1]   Allergies  Allergen Reactions    Rocephin [Ceftriaxone] HIVES

## 2025-02-06 NOTE — OPERATIVE REPORT
Buddy Meek Patient Status:  Hospital Outpatient Surgery    1950 MRN [id number]   [facility] ENDOSCOPY PAIN CENTER Attending Mitesh Peterson MD   Date 2025 PCP Chantelle Enamorado MD     PREOPERATIVE DIAGNOSIS/INDICATION: RICHARD  POSTOPERTATIVE DIAGNOSIS: Diverticulosis  PROCEDURE PERFORMED: COLONOSCOPY  SEDATION: MAC sedation provided by General Anesthesia    TIME OUT WAS PERFORMED    INFORMED CONSENT: Risks, benefits and alternatives to the procedure were explained to the patient including but not limited to bleeding, infection, perforation, adverse drug reactions, pancreatitis and the need for hospitalization and surgery if this occurs, the patient understands and agrees to procedure.  PROCEDURE DESCRIPTION: After careful digital rectal examination a pediatric colonoscope was introduced into the patients rectum, advanced pass the recto sigmoid junction, into the descending colon, splenic flexure, transverse colon, hepatic flexure, ascending colon, cecum and the last 5-10cm of the terminal ileum, confirmed by landmarks, including the appendiceal orifice and ileocecal valve. Careful examination of the above described areas was performed on withdrawal of the endoscope. Retroflexion was performed on the rectum. The patient tolerated the procedure well, there were no immediate complication immediately following the procedure, and the patient was transferred to recovery in stable condition.  QUALITY OF PREPARATION: [place] Bowel Preparation Scale:            -      Right colon 3, Transverse colon 3, Left colon 3   FINDINGS/THERAPEUTICS:  TERMINAL ILEUM: Normal  COLON: Mild left sided diverticulosis  RECOMMENDATIONS:   Post Colonoscopy precautions, watch for bleeding, infection, perforation, adverse drug reactions   Repeat colonoscopy in 10 years if clinically indicated at that time  Consider capsule endoscopy    Mitesh Peterson MD  2025  3:10 PM

## 2025-02-11 NOTE — PROGRESS NOTES
Date: 2025    To: Buddy Meek  : 1950    I hope this letter finds you doing well.  I am writing to inform you of the following:       Biopsies taken during your recent upper endoscopy show infection with a bacteria called Helicobacter pylori (H. pylori).  This bacteria is associated with ulcers and, in some rare cases, with gastric cancers.  It can be treated with a two week course of antibiotics, which will be prescribed for you.  Because it can be a difficult bacteria to treat, it is important that you complete the entire course of antibiotics as prescribed.  It is also recommended that we confirm eradication of the bacteria with a stool test, to be performed in 6 weeks.       Please call the office at [phone] if there are any questions.    Regards,    Mitesh Peterson M.D.

## 2025-05-19 ENCOUNTER — LAB ENCOUNTER (OUTPATIENT)
Dept: LAB | Age: 75
End: 2025-05-19
Attending: INTERNAL MEDICINE
Payer: MEDICARE

## 2025-05-19 DIAGNOSIS — A04.8 BACTERIAL INFECTION DUE TO H. PYLORI: ICD-10-CM

## 2025-05-19 PROCEDURE — 87338 HPYLORI STOOL AG IA: CPT

## 2025-05-20 LAB — H PYLORI AG STL QL IA: NEGATIVE

## (undated) DEVICE — ENDOPATH 5MM ENDOSCOPIC BLUNT TIP DISSECTORS (12 POUCHES CONTAINING 3 DISSECTORS EACH): Brand: ENDOPATH

## (undated) DEVICE — 10FT COMBINED O2 DELIVERY/CO2 MONITORING. FILTER WITH MICROSTREAM TYPE LUER: Brand: DUAL ADULT NASAL CANNULA

## (undated) DEVICE — KIT CUSTOM ENDOPROCEDURE STERIS

## (undated) DEVICE — CATHETER URET 5FR L70CM FLX OPN TIP NONPORTED

## (undated) DEVICE — GIJAW SINGLE-USE BIOPSY FORCEPS WITH NEEDLE: Brand: GIJAW

## (undated) DEVICE — LAPCLINCH GRASPER TIP, DISPOSABLE: Brand: RENEW

## (undated) DEVICE — SURGIFLO ENDOSCOPIC APPICATOR: Brand: ETHICON

## (undated) DEVICE — ABSORBABLE HEMOSTAT (OXIDIZED REGENERATED CELLULOSE): Brand: SURGICEL

## (undated) DEVICE — SHEET,DRAPE,40X58,STERILE: Brand: MEDLINE

## (undated) DEVICE — BANDAGE ADH 1INX3IN NAT FAB N ADH PD CURAD

## (undated) DEVICE — APPLICATOR PREP 26ML CHG 2% ISO ALC 70%

## (undated) DEVICE — #11 STERILE BLADE: Brand: POLYMER COATED BLADES

## (undated) DEVICE — TROCAR: Brand: KII SHIELDED BLADED ACCESS SYSTEM

## (undated) DEVICE — ENDOPATH ULTRA VERESS INSUFFLATION NEEDLES WITH LUER LOCK CONNECTORS: Brand: ENDOPATH

## (undated) DEVICE — Device: Brand: SUTURE PASSOR PRO

## (undated) DEVICE — GOLDVAC PUSH BUTTON ELECTROSURGICAL SMOKE EVACUATION HANDPIECE: Brand: GOLDVAC

## (undated) DEVICE — SLEEVE COMPR MD KNEE LEN SGL USE KENDALL SCD

## (undated) DEVICE — LAP CHOLE/APPY CDS-LF: Brand: MEDLINE INDUSTRIES, INC.

## (undated) DEVICE — SUT COAT VCRL 2-0 27IN SH ABSRB UD 26MM 1/2

## (undated) DEVICE — POUCH SPECIMEN WIRE 6X3 250ML

## (undated) DEVICE — TROCAR: Brand: KII® SLEEVE

## (undated) DEVICE — PDS II VLT 0 107CM AG ST3: Brand: ENDOLOOP

## (undated) DEVICE — L-HOOK CAUTERY PROBE TIP, DISPOSABLE: Brand: RENEW

## (undated) DEVICE — 3M™ RED DOT™ MONITORING ELECTRODE WITH FOAM TAPE AND STICKY GEL, 50/BAG, 20/CASE, 72/PLT 2570: Brand: RED DOT™

## (undated) DEVICE — 1200CC GUARDIAN II: Brand: GUARDIAN

## (undated) DEVICE — GRABBER GRASPER TIP, DISPOSABLE: Brand: RENEW

## (undated) DEVICE — SUT COAT VCRL+ 0 27IN UR-6 ABSRB VLT ANTIBACT

## (undated) DEVICE — TRADITIONAL MARYLAND DISSECTOR TIP, DISPOSABLE: Brand: RENEW

## (undated) DEVICE — MINI ENDOCUT SCISSOR TIP, DISPOSABLE: Brand: RENEW

## (undated) DEVICE — 40580 - THE PINK PAD - ADVANCED TRENDELENBURG POSITIONING KIT: Brand: 40580 - THE PINK PAD - ADVANCED TRENDELENBURG POSITIONING KIT

## (undated) DEVICE — KIT VLV 5 PC AIR H2O SUCT BX ENDOGATOR CONN

## (undated) DEVICE — VISUALIZATION SYSTEM: Brand: CLEARIFY

## (undated) DEVICE — COVER,LIGHT,CAMERA,HARD,1/PK,STRL: Brand: MEDLINE

## (undated) DEVICE — BALLOON HEMOSTATIC EUS LINEAR

## (undated) DEVICE — CLIP APPLIER WITH CLIP LOGIC TECHNOLOGY: Brand: ENDO CLIP III

## (undated) DEVICE — KIT HEMSTAT MTRX 8ML PORCINE GEL HUM THROM

## (undated) DEVICE — SOLUTION IRRIG 1000ML 0.9% NACL USP BTL

## (undated) DEVICE — DALE ABDOMINAL BINDER, 12" WIDE, STRETCHES TO FIT 30"-45", 1 PER BOX.: Brand: DALE ABDOMINAL BINDER

## (undated) DEVICE — UNDYED BRAIDED (POLYGLACTIN 910), SYNTHETIC ABSORBABLE SUTURE: Brand: COATED VICRYL

## (undated) DEVICE — GLOVE SUR 6.5 SENSICARE PI PIP CRM PWD F

## (undated) DEVICE — V2 SPECIMEN COLLECTION MANIFOLD KIT: Brand: NEPTUNE

## (undated) DEVICE — SUT COAT VCRL + 0 54IN ABSRB UD ANTIBACT

## (undated) DEVICE — C-ARM: Brand: UNBRANDED

## (undated) NOTE — LETTER
[address]  [address]  Authorization for Surgical Operation and Procedure     Date:___________                                                                                                         Time:__________  I hereby authorize Surgeon(s):  Ginette Alvarado MD, my physician and his/her assistants (if applicable), which may include medical students, residents, and/or fellows, to perform the following surgical operation/ procedure and administer such anesthesia as may be determined necessary by my physician:  Operation/Procedure name (s) Procedure(s):  LAPAROSCOPIC CHOLECYSTECTOMY WITH INDOCYANINE GREEN on Buddy Meek   2.   I recognize that during the surgical operation/procedure, unforeseen conditions may necessitate additional or different procedures than those listed above.  I, therefore, further authorize and request that the above-named surgeon, assistants, or designees perform such procedures as are, in their judgment, necessary and desirable.    3.   My surgeon/physician has discussed prior to my surgery the potential benefits, risks and side effects of this procedure; the likelihood of achieving goals; and potential problems that might occur during recuperation.  They also discussed reasonable alternatives to the procedure, including risks, benefits, and side effects related to the alternatives and risks related to not receiving this procedure.  I have had all my questions answered and I acknowledge that no guarantee has been made as to the result that may be obtained.    4.   Should the need arise during my operation/procedure, which includes change of level of care prior to discharge, I also consent to the administration of blood and/or blood products.  Further, I understand that despite careful testing and screening of blood or blood products by collecting agencies, I may still be subject to ill effects as a result of receiving a blood transfusion and/or  blood products.  The following are some, but not all, of the potential risks that can occur: fever and allergic reactions, hemolytic reactions, transmission of diseases such as Hepatitis, AIDS and Cytomegalovirus (CMV) and fluid overload.  In the event that I wish to have an autologous transfusion of my own blood, or a directed donor transfusion, I will discuss this with my physician.  Check only if Refusing Blood or Blood Products  I understand refusal of blood or blood products as deemed necessary by my physician may have serious consequences to my condition to include possible death. I hereby assume responsibility for my refusal and release the hospital, its personnel, and my physicians from any responsibility for the consequences of my refusal.          o  Refuse      5.   I authorize the use of any specimen, organs, tissues, body parts or foreign objects that may be removed from my body during the operation/procedure for diagnosis, research or teaching purposes and their subsequent disposal by hospital authorities.  I also authorize the release of specimen test results and/or written reports to my treating physician on the hospital medical staff or other referring or consulting physicians involved in my care, at the discretion of the Pathologist or my treating physician.    6.   I consent to the photographing or videotaping of the operations or procedures to be performed, including appropriate portions of my body for medical, scientific, or educational purposes, provided my identity is not revealed by the pictures or by descriptive texts accompanying them.  If the procedure has been photographed/videotaped, the surgeon will obtain the original picture, image, videotape or CD.  The hospital will not be responsible for storage, release or maintenance of the picture, image, tape or CD.    7.   I consent to the presence of a  or observers in the operating room as deemed necessary by my physician  or their designees.    8.   I recognize that in the event my procedure results in extended X-Ray/fluoroscopy time, I may develop a skin reaction.    9. If I have a Do Not Attempt Resuscitation (DNAR) order in place, that status will be suspended while in the operating room, procedural suite, and during the recovery period unless otherwise explicitly stated by me (or a person authorized to consent on my behalf). The surgeon or my attending physician will determine when the applicable recovery period ends for purposes of reinstating the DNAR order.  10. Patients having a sterilization procedure: I understand that if the procedure is successful the results will be permanent and it will therefore be impossible for me to inseminate, conceive, or bear children.  I also understand that the procedure is intended to result in sterility, although the result has not been guaranteed.   11. I acknowledge that my physician has explained sedation/analgesia administration to me including the risk and benefits I consent to the administration of sedation/analgesia as may be necessary or desirable in the judgment of my physician.    I CERTIFY THAT I HAVE READ AND FULLY UNDERSTAND THE ABOVE CONSENT TO OPERATION and/or OTHER PROCEDURE.    _________________________________________  __________________________________  Signature of Patient     Signature of Responsible Person         ___________________________________         Printed Name of Responsible Person           _________________________________                 Relationship to Patient  _________________________________________  ______________________________  Signature of Witness          Date  Time      Patient Name: Buddy Meek     : 1950                 Printed: 2024     Medical Record #: [id number]                     Page 1 of [address] [place] [address]  [id number]    Consent for Anesthesia    I,  Buddy Meek agree to be cared for by an anesthesiologist, who is specially trained to monitor me and give me medicine to put me to sleep or keep me comfortable during my procedure    I understand that my anesthesiologist is not an employee or agent of [facility] [employer]. He or she works for [employer].    As the patient asking for anesthesia services, I agree to:  Allow the anesthesiologist (anesthesia doctor) to give me medicine and do additional procedures as necessary. Some examples are: Starting or using an “IV” to give me medicine, fluids or blood during my procedure, and having a breathing tube placed to help me breathe when I’m asleep (intubation). In the event that my heart stops working properly, I understand that my anesthesiologist will make every effort to sustain my life, unless otherwise directed by [facility] Do Not Resuscitate documents.  Tell my anesthesia doctor before my procedure:  If I am pregnant.  The last time that I ate or drank.  All of the medicines I take (including prescriptions, herbal supplements, and pills I can buy without a prescription (including street drugs/illegal medications). Failure to inform my anesthesiologist about these medicines may increase my risk of anesthetic complications.  If I am allergic to anything or have had a reaction to anesthesia before.  I understand how the anesthesia medicine will help me (benefits).  I understand that with any type of anesthesia medicine there are risks:  The most common risks are: nausea, vomiting, sore throat, muscle soreness, damage to my eyes, mouth, or teeth (from breathing tube placement).  Rare risks include: remembering what happened during my procedure, allergic reactions to medications, injury to my airway, heart, lungs, vision, nerves, or muscles and in extremely rare instances death.  My doctor has explained to me other choices available to me for my care  (alternatives).  Pregnant Patients (“epidural”):  I understand that the risks of having an epidural (medicine given into my back to help control pain during labor), include itching, low blood pressure, difficulty urinating, headache or slowing of the baby’s heart. Very rare risks include infection, bleeding, seizure, irregular heart rhythms and nerve injury.  Regional Anesthesia (“spinal”, “epidural”, & “nerve blocks”):  I understand that rare but potential complications include headache, bleeding, infection, seizure, irregular heart rhythms, and nerve injury.    I can change my mind about having anesthesia services at any time before I get the medicine.    _____________________________________________________________________________  Patient (or Representative) Signature/Relationship to Patient  Date   Time    _____________________________________________________________________________   Name (if used)    Language/Organization   Time    _____________________________________________________________________________  Anesthesiologist Signature     Date   Time  I have discussed the procedure and information above with the patient (or patient’s representative) and answered their questions. The patient or their representative has agreed to have anesthesia services.    _____________________________________________________________________________  Witness        Date   Time  I have verified that the signature is that of the patient or patient’s representative, and that it was signed before the procedure  Patient Name: Buddy Meek     : 1950                 Printed: 2024     Medical Record #: [id number]                     Page 2 of 2

## (undated) NOTE — ED AVS SNAPSHOT
Franck Bennett   MRN: [id number]    Department:  [facility] Emergency Department in [place]   Date of Visit:  6/3/2018           Disclosure     Insurance plans vary and the physician(s) referred by the ER may not be covered by your plan.  Please contact yo tell this physician (or your personal doctor if your instructions are to return to your personal doctor) about any new or lasting problems. The primary care or specialist physician will see patients referred from the [facility] Emergency Department.  Johanne Castellon

## (undated) NOTE — LETTER
[address]  [address]  Authorization for Surgical Operation and Procedure     Date:___________                                                                                                         Time:__________  I hereby authorize Surgeon(s):  Ginette Alvarado MD, my physician and his/her assistants (if applicable), which may include medical students, residents, and/or fellows, to perform the following surgical operation/ procedure and administer such anesthesia as may be determined necessary by my physician:  Operation/Procedure name (s) Procedure(s):  LAPAROSCOPIC CHOLECYSTECTOMY WITH INDOCYANINE GREEN, REPAIR OF UMBILICAL HERNIA on Buddy Meek   2.   I recognize that during the surgical operation/procedure, unforeseen conditions may necessitate additional or different procedures than those listed above.  I, therefore, further authorize and request that the above-named surgeon, assistants, or designees perform such procedures as are, in their judgment, necessary and desirable.    3.   My surgeon/physician has discussed prior to my surgery the potential benefits, risks and side effects of this procedure; the likelihood of achieving goals; and potential problems that might occur during recuperation.  They also discussed reasonable alternatives to the procedure, including risks, benefits, and side effects related to the alternatives and risks related to not receiving this procedure.  I have had all my questions answered and I acknowledge that no guarantee has been made as to the result that may be obtained.    4.   Should the need arise during my operation/procedure, which includes change of level of care prior to discharge, I also consent to the administration of blood and/or blood products.  Further, I understand that despite careful testing and screening of blood or blood products by collecting agencies, I may still be subject to ill effects as a result of receiving a  blood transfusion and/or blood products.  The following are some, but not all, of the potential risks that can occur: fever and allergic reactions, hemolytic reactions, transmission of diseases such as Hepatitis, AIDS and Cytomegalovirus (CMV) and fluid overload.  In the event that I wish to have an autologous transfusion of my own blood, or a directed donor transfusion, I will discuss this with my physician.  Check only if Refusing Blood or Blood Products  I understand refusal of blood or blood products as deemed necessary by my physician may have serious consequences to my condition to include possible death. I hereby assume responsibility for my refusal and release the hospital, its personnel, and my physicians from any responsibility for the consequences of my refusal.          o  Refuse      5.   I authorize the use of any specimen, organs, tissues, body parts or foreign objects that may be removed from my body during the operation/procedure for diagnosis, research or teaching purposes and their subsequent disposal by hospital authorities.  I also authorize the release of specimen test results and/or written reports to my treating physician on the hospital medical staff or other referring or consulting physicians involved in my care, at the discretion of the Pathologist or my treating physician.    6.   I consent to the photographing or videotaping of the operations or procedures to be performed, including appropriate portions of my body for medical, scientific, or educational purposes, provided my identity is not revealed by the pictures or by descriptive texts accompanying them.  If the procedure has been photographed/videotaped, the surgeon will obtain the original picture, image, videotape or CD.  The hospital will not be responsible for storage, release or maintenance of the picture, image, tape or CD.    7.   I consent to the presence of a  or observers in the operating room as deemed  necessary by my physician or their designees.    8.   I recognize that in the event my procedure results in extended X-Ray/fluoroscopy time, I may develop a skin reaction.    9. If I have a Do Not Attempt Resuscitation (DNAR) order in place, that status will be suspended while in the operating room, procedural suite, and during the recovery period unless otherwise explicitly stated by me (or a person authorized to consent on my behalf). The surgeon or my attending physician will determine when the applicable recovery period ends for purposes of reinstating the DNAR order.  10. Patients having a sterilization procedure: I understand that if the procedure is successful the results will be permanent and it will therefore be impossible for me to inseminate, conceive, or bear children.  I also understand that the procedure is intended to result in sterility, although the result has not been guaranteed.   11. I acknowledge that my physician has explained sedation/analgesia administration to me including the risk and benefits I consent to the administration of sedation/analgesia as may be necessary or desirable in the judgment of my physician.    I CERTIFY THAT I HAVE READ AND FULLY UNDERSTAND THE ABOVE CONSENT TO OPERATION and/or OTHER PROCEDURE.    _________________________________________  __________________________________  Signature of Patient     Signature of Responsible Person         ___________________________________         Printed Name of Responsible Person           _________________________________                 Relationship to Patient  _________________________________________  ______________________________  Signature of Witness          Date  Time      Patient Name: Buddy Meek     : 1950                 Printed: 2024     Medical Record #: [id number]                     Page 1 of 2                                    [address]   [address]    Consent for Anesthesia    IBuddy agree to be cared for by an anesthesiologist, who is specially trained to monitor me and give me medicine to put me to sleep or keep me comfortable during my procedure    I understand that my anesthesiologist is not an employee or agent of [facility] or [employer]. He or she works for [employer].    As the patient asking for anesthesia services, I agree to:  Allow the anesthesiologist (anesthesia doctor) to give me medicine and do additional procedures as necessary. Some examples are: Starting or using an “IV” to give me medicine, fluids or blood during my procedure, and having a breathing tube placed to help me breathe when I’m asleep (intubation). In the event that my heart stops working properly, I understand that my anesthesiologist will make every effort to sustain my life, unless otherwise directed by [facility] Do Not Resuscitate documents.  Tell my anesthesia doctor before my procedure:  If I am pregnant.  The last time that I ate or drank.  All of the medicines I take (including prescriptions, herbal supplements, and pills I can buy without a prescription (including street drugs/illegal medications). Failure to inform my anesthesiologist about these medicines may increase my risk of anesthetic complications.  If I am allergic to anything or have had a reaction to anesthesia before.  I understand how the anesthesia medicine will help me (benefits).  I understand that with any type of anesthesia medicine there are risks:  The most common risks are: nausea, vomiting, sore throat, muscle soreness, damage to my eyes, mouth, or teeth (from breathing tube placement).  Rare risks include: remembering what happened during my procedure, allergic reactions to medications, injury to my airway, heart, lungs, vision, nerves, or muscles and in extremely rare instances death.  My doctor has explained to me other choices available to  me for my care (alternatives).  Pregnant Patients (“epidural”):  I understand that the risks of having an epidural (medicine given into my back to help control pain during labor), include itching, low blood pressure, difficulty urinating, headache or slowing of the baby’s heart. Very rare risks include infection, bleeding, seizure, irregular heart rhythms and nerve injury.  Regional Anesthesia (“spinal”, “epidural”, & “nerve blocks”):  I understand that rare but potential complications include headache, bleeding, infection, seizure, irregular heart rhythms, and nerve injury.    I can change my mind about having anesthesia services at any time before I get the medicine.    _____________________________________________________________________________  Patient (or Representative) Signature/Relationship to Patient  Date   Time    _____________________________________________________________________________   Name (if used)    Language/Organization   Time    _____________________________________________________________________________  Anesthesiologist Signature     Date   Time  I have discussed the procedure and information above with the patient (or patient’s representative) and answered their questions. The patient or their representative has agreed to have anesthesia services.    _____________________________________________________________________________  Witness        Date   Time  I have verified that the signature is that of the patient or patient’s representative, and that it was signed before the procedure  Patient Name: Buddy Meek     : 1950                 Printed: 2024     Medical Record #: [id number]                     Page 2 of 2

## (undated) NOTE — ED AVS SNAPSHOT
Niki Uribe   MRN: [id number]    Department:  [facility] in [place]   Date of Visit:  6/21/2019           Disclosure     Insurance plans vary and the physician(s) referred by the ER may not be covered by your plan.  Please contact y tell this physician (or your personal doctor if your instructions are to return to your personal doctor) about any new or lasting problems. The primary care or specialist physician will see patients referred from the [facility] Emergency Department.  Angel Dunham